# Patient Record
Sex: FEMALE | Race: WHITE | NOT HISPANIC OR LATINO | Employment: FULL TIME | URBAN - METROPOLITAN AREA
[De-identification: names, ages, dates, MRNs, and addresses within clinical notes are randomized per-mention and may not be internally consistent; named-entity substitution may affect disease eponyms.]

---

## 2017-10-31 ENCOUNTER — OFFICE VISIT (OUTPATIENT)
Dept: PRIMARY CARE CLINIC | Facility: CLINIC | Age: 22
End: 2017-10-31
Payer: MEDICAID

## 2017-10-31 VITALS
BODY MASS INDEX: 25.54 KG/M2 | HEART RATE: 68 BPM | WEIGHT: 149.63 LBS | RESPIRATION RATE: 18 BRPM | HEIGHT: 64 IN | SYSTOLIC BLOOD PRESSURE: 108 MMHG | TEMPERATURE: 98 F | DIASTOLIC BLOOD PRESSURE: 72 MMHG

## 2017-10-31 DIAGNOSIS — J06.9 VIRAL URI WITH COUGH: Primary | ICD-10-CM

## 2017-10-31 PROBLEM — G47.00 INSOMNIA: Status: ACTIVE | Noted: 2017-10-31

## 2017-10-31 PROBLEM — F32.A DEPRESSION: Status: ACTIVE | Noted: 2017-10-31

## 2017-10-31 PROBLEM — G43.009 NONINTRACTABLE MIGRAINE: Status: ACTIVE | Noted: 2017-10-31

## 2017-10-31 PROBLEM — R91.1 PULMONARY NODULE: Status: ACTIVE | Noted: 2017-10-31

## 2017-10-31 PROCEDURE — 99214 OFFICE O/P EST MOD 30 MIN: CPT | Mod: S$PBB,,, | Performed by: NURSE PRACTITIONER

## 2017-10-31 PROCEDURE — 99999 PR PBB SHADOW E&M-NEW PATIENT-LVL III: CPT | Mod: PBBFAC,,, | Performed by: NURSE PRACTITIONER

## 2017-10-31 PROCEDURE — 99203 OFFICE O/P NEW LOW 30 MIN: CPT | Mod: PBBFAC,PN | Performed by: NURSE PRACTITIONER

## 2017-10-31 RX ORDER — PROMETHAZINE HYDROCHLORIDE AND DEXTROMETHORPHAN HYDROBROMIDE 6.25; 15 MG/5ML; MG/5ML
5 SYRUP ORAL
Qty: 118 ML | Refills: 0 | Status: SHIPPED | OUTPATIENT
Start: 2017-10-31 | End: 2017-11-10

## 2017-10-31 RX ORDER — METHYLPREDNISOLONE 4 MG/1
TABLET ORAL
Qty: 1 PACKAGE | Refills: 0 | Status: SHIPPED | OUTPATIENT
Start: 2017-10-31 | End: 2018-02-01

## 2017-10-31 NOTE — PROGRESS NOTES
Chief Complaint  Chief Complaint   Patient presents with    Nasal Congestion     symptoms started 3 days ago    Sinusitis    Cough    Fever     chlls and fever the last 2 nights       HPI  Anabela Carrizales is a 22 y.o. female with multiple medical diagnoses as listed in the medical history and problem list that presents for nasal congestion, chills, and muscle aches for approximately 3 days.  Patient is new to me but is known to this clinic. Cough, non-productive. No sore throat. + diarrhea. Decreased appetite. Not currently taking any OTC meds at this time. Denies CP, SOB, palpitations.       PAST MEDICAL HISTORY:  History reviewed. No pertinent past medical history.    PAST SURGICAL HISTORY:  History reviewed. No pertinent surgical history.    SOCIAL HISTORY:  Social History     Social History    Marital status: Single     Spouse name: N/A    Number of children: N/A    Years of education: N/A     Occupational History    Not on file.     Social History Main Topics    Smoking status: Current Every Day Smoker     Packs/day: 0.50     Types: Cigarettes    Smokeless tobacco: Never Used    Alcohol use Yes      Comment: 3-4 x a week    Drug use: No    Sexual activity: Not Currently     Other Topics Concern    Not on file     Social History Narrative    No narrative on file       FAMILY HISTORY:  Family History   Problem Relation Age of Onset    Family history unknown: Yes       ALLERGIES AND MEDICATIONS: updated and reviewed.  Review of patient's allergies indicates:  No Known Allergies  Current Outpatient Prescriptions   Medication Sig Dispense Refill    methylPREDNISolone (MEDROL DOSEPACK) 4 mg tablet use as directed 1 Package 0    promethazine-dextromethorphan (PROMETHAZINE-DM) 6.25-15 mg/5 mL Syrp Take 5 mLs by mouth every 4 to 6 hours as needed. 118 mL 0     No current facility-administered medications for this visit.          ROS  Review of Systems   Constitutional: Negative for chills, fatigue  "and fever.   HENT: Positive for congestion, postnasal drip, rhinorrhea and sinus pressure. Negative for sore throat.    Respiratory: Positive for cough. Negative for chest tightness and shortness of breath.    Cardiovascular: Negative for chest pain and palpitations.   Gastrointestinal: Negative for blood in stool, diarrhea, nausea and vomiting.   Genitourinary: Negative for dysuria, frequency, hematuria and urgency.   Musculoskeletal: Negative for arthralgias and joint swelling.   Skin: Negative for rash and wound.   Neurological: Negative for dizziness and headaches.   Psychiatric/Behavioral: Negative for dysphoric mood and sleep disturbance. The patient is not nervous/anxious.          PHYSICAL EXAM  Vitals:    10/31/17 1418   BP: 108/72   BP Location: Left arm   Patient Position: Sitting   BP Method: Medium (Automatic)   Pulse: 68   Resp: 18   Temp: 98.1 °F (36.7 °C)   TempSrc: Oral   Weight: 67.9 kg (149 lb 9.6 oz)   Height: 5' 4" (1.626 m)    Body mass index is 25.68 kg/m².  Weight: 67.9 kg (149 lb 9.6 oz)   Height: 5' 4" (162.6 cm)     Physical Exam   Constitutional: She is oriented to person, place, and time. She appears well-developed and well-nourished.   HENT:   Head: Normocephalic.   Right Ear: Tympanic membrane normal.   Left Ear: Tympanic membrane normal.   Mouth/Throat: Uvula is midline and mucous membranes are normal. Posterior oropharyngeal erythema present.   Eyes: Conjunctivae are normal.   Cardiovascular: Normal rate, regular rhythm, normal heart sounds and normal pulses.    No murmur heard.  Pulses:       Radial pulses are 2+ on the right side, and 2+ on the left side.   Pulmonary/Chest: Effort normal and breath sounds normal. She has no wheezes.   Abdominal: Soft. Bowel sounds are normal. There is no tenderness.   Musculoskeletal: She exhibits no edema.   Lymphadenopathy:     She has no cervical adenopathy.   Neurological: She is alert and oriented to person, place, and time.   Skin: Skin is " warm and dry. No rash noted.   Psychiatric: She has a normal mood and affect.         Health Maintenance    Patient has no pending health maintenance at this time           Assessment & Plan    Anabela was seen today for nasal congestion, sinusitis, cough and fever.    Diagnoses and all orders for this visit:    Viral URI with cough        - Symptomatic treatment with tylenol and motrin.     Other orders  -     methylPREDNISolone (MEDROL DOSEPACK) 4 mg tablet; use as directed  -     promethazine-dextromethorphan (PROMETHAZINE-DM) 6.25-15 mg/5 mL Syrp; Take 5 mLs by mouth every 4 to 6 hours as needed.    Health Maintenance reviewed.    Follow-up: Return if symptoms worsen or fail to improve.

## 2018-03-07 ENCOUNTER — OFFICE VISIT (OUTPATIENT)
Dept: PRIMARY CARE CLINIC | Facility: CLINIC | Age: 23
End: 2018-03-07
Payer: MEDICAID

## 2018-03-07 VITALS
BODY MASS INDEX: 26.57 KG/M2 | OXYGEN SATURATION: 99 % | RESPIRATION RATE: 18 BRPM | DIASTOLIC BLOOD PRESSURE: 66 MMHG | SYSTOLIC BLOOD PRESSURE: 102 MMHG | TEMPERATURE: 98 F | WEIGHT: 155.63 LBS | HEIGHT: 64 IN | HEART RATE: 76 BPM

## 2018-03-07 DIAGNOSIS — R10.9 ABDOMINAL PAIN, UNSPECIFIED ABDOMINAL LOCATION: ICD-10-CM

## 2018-03-07 DIAGNOSIS — J02.9 PHARYNGITIS, UNSPECIFIED ETIOLOGY: Primary | ICD-10-CM

## 2018-03-07 LAB
BILIRUB SERPL-MCNC: ABNORMAL MG/DL
BLOOD URINE, POC: ABNORMAL
COLOR, POC UA: YELLOW
CTP QC/QA: YES
GLUCOSE UR QL STRIP: NORMAL
KETONES UR QL STRIP: ABNORMAL
LEUKOCYTE ESTERASE URINE, POC: ABNORMAL
NITRITE, POC UA: ABNORMAL
PH, POC UA: 6
PROTEIN, POC: ABNORMAL
S PYO RRNA THROAT QL PROBE: NEGATIVE
SPECIFIC GRAVITY, POC UA: 1.02
UROBILINOGEN, POC UA: NORMAL

## 2018-03-07 PROCEDURE — 87880 STREP A ASSAY W/OPTIC: CPT | Mod: PBBFAC,PN | Performed by: NURSE PRACTITIONER

## 2018-03-07 PROCEDURE — 99214 OFFICE O/P EST MOD 30 MIN: CPT | Mod: PBBFAC,PN | Performed by: NURSE PRACTITIONER

## 2018-03-07 PROCEDURE — 87088 URINE BACTERIA CULTURE: CPT

## 2018-03-07 PROCEDURE — 87086 URINE CULTURE/COLONY COUNT: CPT

## 2018-03-07 PROCEDURE — 99999 PR PBB SHADOW E&M-EST. PATIENT-LVL IV: CPT | Mod: PBBFAC,,, | Performed by: NURSE PRACTITIONER

## 2018-03-07 PROCEDURE — 81002 URINALYSIS NONAUTO W/O SCOPE: CPT | Mod: PBBFAC,PN | Performed by: NURSE PRACTITIONER

## 2018-03-07 PROCEDURE — 99214 OFFICE O/P EST MOD 30 MIN: CPT | Mod: S$PBB,,, | Performed by: NURSE PRACTITIONER

## 2018-03-07 PROCEDURE — 87147 CULTURE TYPE IMMUNOLOGIC: CPT

## 2018-03-07 RX ORDER — AMOXICILLIN AND CLAVULANATE POTASSIUM 875; 125 MG/1; MG/1
1 TABLET, FILM COATED ORAL EVERY 12 HOURS
Qty: 14 TABLET | Refills: 0 | Status: SHIPPED | OUTPATIENT
Start: 2018-03-07 | End: 2018-07-26

## 2018-03-07 RX ORDER — PROMETHAZINE HYDROCHLORIDE AND DEXTROMETHORPHAN HYDROBROMIDE 6.25; 15 MG/5ML; MG/5ML
5 SYRUP ORAL
Qty: 180 ML | Refills: 0 | Status: SHIPPED | OUTPATIENT
Start: 2018-03-07 | End: 2018-03-17

## 2018-03-07 NOTE — PROGRESS NOTES
Chief Complaint  Chief Complaint   Patient presents with    Sore Throat     x 4 days    Abdominal Pain       HPI  Anabela Carrizales is a 22 y.o. female with multiple medical diagnoses as listed in the medical history and problem list that presents for sore throat and difficulty swallowing.  Patient is new to me but is known to this clinic with her last appointment being 10/31/2017.  Patient reports the onset of sore throat, dysphagia, abdominal pain 4 days ago. No associated vomiting or diarrhea. No fever or chills. No cough. No earpain. No known sick contacts. Abdominal pain described as stabbing bilateral sides and back. No dysuria, suprapubic pressure, hematuria. Patient denies cp, sob, palpitations.       PAST MEDICAL HISTORY:  History reviewed. No pertinent past medical history.    PAST SURGICAL HISTORY:  History reviewed. No pertinent surgical history.    SOCIAL HISTORY:  Social History     Social History    Marital status: Single     Spouse name: N/A    Number of children: N/A    Years of education: N/A     Occupational History    Not on file.     Social History Main Topics    Smoking status: Current Every Day Smoker     Packs/day: 0.00     Types: Vaping with nicotine    Smokeless tobacco: Never Used    Alcohol use Yes      Comment: 3-4 x a week    Drug use: No    Sexual activity: Yes     Birth control/ protection: None     Other Topics Concern    Not on file     Social History Narrative    No narrative on file       FAMILY HISTORY:  Family History   Problem Relation Age of Onset    Family history unknown: Yes       ALLERGIES AND MEDICATIONS: updated and reviewed.  Review of patient's allergies indicates:  No Known Allergies  No current outpatient prescriptions on file.     No current facility-administered medications for this visit.          ROS  Review of Systems   Constitutional: Negative for chills, fatigue and fever.   HENT: Positive for sore throat and trouble swallowing. Negative for  "congestion, rhinorrhea and sinus pressure.    Respiratory: Positive for cough. Negative for chest tightness and shortness of breath.    Cardiovascular: Negative for chest pain and palpitations.   Gastrointestinal: Positive for abdominal pain. Negative for blood in stool, diarrhea, nausea and vomiting.   Genitourinary: Negative for dysuria, frequency, hematuria and urgency.   Musculoskeletal: Negative for arthralgias and back pain.   Skin: Negative for rash and wound.   Neurological: Negative for dizziness and headaches.   Psychiatric/Behavioral: Negative for dysphoric mood and sleep disturbance. The patient is not nervous/anxious.          PHYSICAL EXAM  Vitals:    03/07/18 1055   BP: 102/66   BP Location: Right arm   Patient Position: Sitting   BP Method: Medium (Automatic)   Pulse: 76   Resp: 18   Temp: 98.2 °F (36.8 °C)   TempSrc: Oral   SpO2: 99%   Weight: 70.6 kg (155 lb 9.6 oz)   Height: 5' 4" (1.626 m)    Body mass index is 26.71 kg/m².  Weight: 70.6 kg (155 lb 9.6 oz)   Height: 5' 4" (162.6 cm)     Physical Exam   Constitutional: She is oriented to person, place, and time. She appears well-developed and well-nourished.   HENT:   Head: Normocephalic.   Right Ear: Tympanic membrane normal.   Left Ear: Tympanic membrane normal.   Mouth/Throat: Uvula is midline and mucous membranes are normal. Posterior oropharyngeal erythema present. Tonsils are 2+ on the right. Tonsils are 2+ on the left. No tonsillar exudate.   Eyes: Conjunctivae are normal.   Cardiovascular: Normal rate, regular rhythm, normal heart sounds and normal pulses.    No murmur heard.  Pulses:       Radial pulses are 2+ on the right side, and 2+ on the left side.   No LE swelling noted   Pulmonary/Chest: Effort normal and breath sounds normal. She has no wheezes.   Abdominal: Soft. Bowel sounds are normal. There is no tenderness.   Musculoskeletal: She exhibits no edema.   Lymphadenopathy:     She has cervical adenopathy.   Neurological: She is " alert and oriented to person, place, and time.   Skin: Skin is warm and dry. No rash noted.   Psychiatric: She has a normal mood and affect.         Health Maintenance       Date Due Completion Date    Lipid Panel 1996 ---    HPV Vaccines (1 of 3 - Female 3 Dose Series) 04/15/2007 ---    TETANUS VACCINE 04/15/2014 ---    Pneumococcal PPSV23 (Medium Risk) (1) 04/15/2014 ---    Pap Smear 04/15/2017 ---    Influenza Vaccine 08/01/2017 ---    CHLAMYDIA SCREENING 02/01/2019 2/1/2018            Assessment & Plan    Anabela was seen today for sore throat and abdominal pain.    Diagnoses and all orders for this visit:    Pharyngitis, unspecified etiology  -     POCT Rapid Strep A  -     amoxicillin-clavulanate 875-125mg (AUGMENTIN) 875-125 mg per tablet; Take 1 tablet by mouth every 12 (twelve) hours.  -     promethazine-dextromethorphan (PROMETHAZINE-DM) 6.25-15 mg/5 mL Syrp; Take 5 mLs by mouth every 4 to 6 hours as needed.    Abdominal pain, unspecified abdominal location  -     POCT urine dipstick without microscope  -     Urine culture        Follow-up: Follow-up if symptoms worsen or fail to improve.

## 2018-03-09 LAB — BACTERIA UR CULT: NORMAL

## 2018-07-26 ENCOUNTER — CLINICAL SUPPORT (OUTPATIENT)
Dept: PRIMARY CARE CLINIC | Facility: CLINIC | Age: 23
End: 2018-07-26
Payer: MEDICAID

## 2018-07-26 ENCOUNTER — TELEPHONE (OUTPATIENT)
Dept: PRIMARY CARE CLINIC | Facility: CLINIC | Age: 23
End: 2018-07-26

## 2018-07-26 DIAGNOSIS — R39.15 URINARY URGENCY: Primary | ICD-10-CM

## 2018-07-26 DIAGNOSIS — R35.0 URINARY FREQUENCY: ICD-10-CM

## 2018-07-26 DIAGNOSIS — R10.30 LOWER ABDOMINAL PAIN: ICD-10-CM

## 2018-07-26 LAB
BILIRUB SERPL-MCNC: ABNORMAL MG/DL
BLOOD URINE, POC: 250
COLOR, POC UA: YELLOW
GLUCOSE UR QL STRIP: NORMAL
KETONES UR QL STRIP: ABNORMAL
LEUKOCYTE ESTERASE URINE, POC: ABNORMAL
NITRITE, POC UA: POSITIVE
PH, POC UA: 7
PROTEIN, POC: ABNORMAL
SPECIFIC GRAVITY, POC UA: 1.01
UROBILINOGEN, POC UA: NORMAL

## 2018-07-26 PROCEDURE — 99999 PR PBB SHADOW E&M-EST. PATIENT-LVL II: CPT | Mod: PBBFAC,,,

## 2018-07-26 PROCEDURE — 87086 URINE CULTURE/COLONY COUNT: CPT

## 2018-07-26 PROCEDURE — 99212 OFFICE O/P EST SF 10 MIN: CPT | Mod: PBBFAC,PN

## 2018-07-26 PROCEDURE — 81002 URINALYSIS NONAUTO W/O SCOPE: CPT | Mod: PBBFAC,PN

## 2018-07-26 RX ORDER — NITROFURANTOIN 25; 75 MG/1; MG/1
100 CAPSULE ORAL 2 TIMES DAILY
Qty: 14 CAPSULE | Refills: 0 | Status: SHIPPED | OUTPATIENT
Start: 2018-07-26 | End: 2018-08-02

## 2018-07-26 NOTE — TELEPHONE ENCOUNTER
----- Message from Jenni Butcher sent at 7/26/2018 12:40 PM CDT -----  Type:  Patient Returning Call    Who Called:  patient  Who Left Message for Patient:  Zachariah  Does the patient know what this is regarding?:  yes  Best Call Back Number:  [phone] (home)     Additional Information:  Na

## 2018-07-26 NOTE — TELEPHONE ENCOUNTER
----- Message from Kayleigh Lawler sent at 7/26/2018 10:51 AM CDT -----  Contact: Self  Patient needs to get in today for a UTI   No appts available today or tomorrow     Please call back [phone]

## 2018-07-28 LAB — BACTERIA UR CULT: NORMAL

## 2018-09-04 ENCOUNTER — OFFICE VISIT (OUTPATIENT)
Dept: PRIMARY CARE CLINIC | Facility: CLINIC | Age: 23
End: 2018-09-04
Payer: MEDICAID

## 2018-09-04 ENCOUNTER — CLINICAL SUPPORT (OUTPATIENT)
Dept: PRIMARY CARE CLINIC | Facility: CLINIC | Age: 23
End: 2018-09-04
Payer: MEDICAID

## 2018-09-04 VITALS
WEIGHT: 174 LBS | HEART RATE: 79 BPM | DIASTOLIC BLOOD PRESSURE: 66 MMHG | TEMPERATURE: 98 F | HEIGHT: 64 IN | RESPIRATION RATE: 18 BRPM | BODY MASS INDEX: 29.71 KG/M2 | SYSTOLIC BLOOD PRESSURE: 109 MMHG

## 2018-09-04 DIAGNOSIS — N94.9 GENITAL LESION, FEMALE: Primary | ICD-10-CM

## 2018-09-04 DIAGNOSIS — Z00.00 ROUTINE PHYSICAL EXAMINATION: ICD-10-CM

## 2018-09-04 DIAGNOSIS — N94.9 GENITAL LESION, FEMALE: ICD-10-CM

## 2018-09-04 DIAGNOSIS — K12.0 CANKER SORES ORAL: ICD-10-CM

## 2018-09-04 LAB
ALBUMIN SERPL BCP-MCNC: 4.4 G/DL
ALP SERPL-CCNC: 51 U/L
ALT SERPL W/O P-5'-P-CCNC: 13 U/L
ANION GAP SERPL CALC-SCNC: 6 MMOL/L
AST SERPL-CCNC: 18 U/L
BASOPHILS # BLD AUTO: 0 K/UL
BASOPHILS NFR BLD: 0.4 %
BILIRUB SERPL-MCNC: 0.7 MG/DL
BUN SERPL-MCNC: 11 MG/DL
CALCIUM SERPL-MCNC: 9.4 MG/DL
CHLORIDE SERPL-SCNC: 101 MMOL/L
CO2 SERPL-SCNC: 29 MMOL/L
CREAT SERPL-MCNC: 0.8 MG/DL
DIFFERENTIAL METHOD: ABNORMAL
EOSINOPHIL # BLD AUTO: 0.3 K/UL
EOSINOPHIL NFR BLD: 4.7 %
ERYTHROCYTE [DISTWIDTH] IN BLOOD BY AUTOMATED COUNT: 13.3 %
EST. GFR  (AFRICAN AMERICAN): >60 ML/MIN/1.73 M^2
EST. GFR  (NON AFRICAN AMERICAN): >60 ML/MIN/1.73 M^2
GLUCOSE SERPL-MCNC: 88 MG/DL
HCT VFR BLD AUTO: 39.6 %
HGB BLD-MCNC: 13.5 G/DL
LYMPHOCYTES # BLD AUTO: 2 K/UL
LYMPHOCYTES NFR BLD: 29.9 %
MCH RBC QN AUTO: 30 PG
MCHC RBC AUTO-ENTMCNC: 34.1 G/DL
MCV RBC AUTO: 88 FL
MONOCYTES # BLD AUTO: 0.8 K/UL
MONOCYTES NFR BLD: 11.4 %
NEUTROPHILS # BLD AUTO: 3.7 K/UL
NEUTROPHILS NFR BLD: 53.6 %
PLATELET # BLD AUTO: 252 K/UL
PMV BLD AUTO: 8 FL
POTASSIUM SERPL-SCNC: 3.8 MMOL/L
PROT SERPL-MCNC: 7.6 G/DL
RBC # BLD AUTO: 4.5 M/UL
SODIUM SERPL-SCNC: 136 MMOL/L
TSH SERPL DL<=0.005 MIU/L-ACNC: 1.5 UIU/ML
WBC # BLD AUTO: 6.8 K/UL

## 2018-09-04 PROCEDURE — 99213 OFFICE O/P EST LOW 20 MIN: CPT | Mod: PBBFAC,PN | Performed by: NURSE PRACTITIONER

## 2018-09-04 PROCEDURE — 99999 PR PBB SHADOW E&M-EST. PATIENT-LVL III: CPT | Mod: PBBFAC,,, | Performed by: NURSE PRACTITIONER

## 2018-09-04 PROCEDURE — 87491 CHLMYD TRACH DNA AMP PROBE: CPT

## 2018-09-04 PROCEDURE — 99999 PR PBB SHADOW E&M-EST. PATIENT-LVL II: CPT | Mod: PBBFAC,,,

## 2018-09-04 PROCEDURE — 86696 HERPES SIMPLEX TYPE 2 TEST: CPT

## 2018-09-04 PROCEDURE — 99214 OFFICE O/P EST MOD 30 MIN: CPT | Mod: S$PBB,,, | Performed by: NURSE PRACTITIONER

## 2018-09-04 PROCEDURE — 86592 SYPHILIS TEST NON-TREP QUAL: CPT

## 2018-09-04 PROCEDURE — 99212 OFFICE O/P EST SF 10 MIN: CPT | Mod: PBBFAC,27,PN

## 2018-09-04 PROCEDURE — 86703 HIV-1/HIV-2 1 RESULT ANTBDY: CPT

## 2018-09-04 NOTE — PROGRESS NOTES
"Chief Complaint  Chief Complaint   Patient presents with    Rash     breakouts that are irritated and hurt       HPI  Anabela Carrizales is a 23 y.o. female with multiple medical diagnoses as listed in the medical history and problem list that presents for oral and vaginal lesion.    Patient reports he initial onset of canker sores in her mouth and sores in her general region approximately 10 years ago when she was 13 years old.  Reports a period of dormancy when she had new lesions that have recurred more frequently over the last year.  Reports the occurrence of vaginal sores monthly.  Described as small, painful, blisters.  The go independently without any intervention.  Associated painful urination.  States that she went to the ER in February and had a full a STI workup with negative for all STIs.  At this time she was treated with antibiotics and told "hope this helps".  Associated fatigue when the lesions occur.  Treatment with Epson salt baths.  No history of HSV exposure that she knows of.  Reports that the original vaginal lesions occurred prior to her having sexual intercourse.      PAST MEDICAL HISTORY:  History reviewed. No pertinent past medical history.    PAST SURGICAL HISTORY:  History reviewed. No pertinent surgical history.    SOCIAL HISTORY:  Social History     Socioeconomic History    Marital status: Single     Spouse name: Not on file    Number of children: Not on file    Years of education: Not on file    Highest education level: Not on file   Social Needs    Financial resource strain: Not on file    Food insecurity - worry: Not on file    Food insecurity - inability: Not on file    Transportation needs - medical: Not on file    Transportation needs - non-medical: Not on file   Occupational History    Not on file   Tobacco Use    Smoking status: Current Every Day Smoker     Packs/day: 0.00     Types: Vaping with nicotine    Smokeless tobacco: Never Used   Substance and Sexual " "Activity    Alcohol use: Yes     Comment: 3-4 x a week    Drug use: No    Sexual activity: Yes     Birth control/protection: None   Other Topics Concern    Not on file   Social History Narrative    Not on file       FAMILY HISTORY:  Family History   Family history unknown: Yes       ALLERGIES AND MEDICATIONS: updated and reviewed.  Review of patient's allergies indicates:   Allergen Reactions    Bactrim [sulfamethoxazole-trimethoprim] Other (See Comments)     Stomach upset     No current outpatient medications on file.     No current facility-administered medications for this visit.          ROS  Review of Systems   Constitutional: Positive for fatigue and unexpected weight change (Weight gain). Negative for chills and fever.   HENT: Negative for congestion, rhinorrhea, sinus pressure and sore throat.    Respiratory: Negative for cough, chest tightness and shortness of breath.    Cardiovascular: Negative for chest pain and palpitations.   Gastrointestinal: Negative for blood in stool, diarrhea, nausea and vomiting.   Genitourinary: Positive for dysuria. Negative for frequency, hematuria and urgency.   Musculoskeletal: Negative for arthralgias and back pain.   Skin: Positive for wound. Negative for rash.   Neurological: Negative for dizziness and headaches.   Psychiatric/Behavioral: Negative for dysphoric mood and sleep disturbance. The patient is not nervous/anxious.          PHYSICAL EXAM  Vitals:    09/04/18 1302   BP: 109/66   BP Location: Right arm   Patient Position: Sitting   BP Method: Medium (Automatic)   Pulse: 79   Resp: 18   Temp: 98.1 °F (36.7 °C)   TempSrc: Oral   Weight: 78.9 kg (174 lb)   Height: 5' 4" (1.626 m)    Body mass index is 29.87 kg/m².  Weight: 78.9 kg (174 lb)   Height: 5' 4" (162.6 cm)     Physical Exam   Constitutional: She is oriented to person, place, and time. She appears well-developed and well-nourished.   HENT:   Head: Normocephalic.   Right Ear: Tympanic membrane normal. "   Left Ear: Tympanic membrane normal.   Mouth/Throat: Uvula is midline, oropharynx is clear and moist and mucous membranes are normal.   Eyes: Conjunctivae are normal.   Cardiovascular: Normal rate, regular rhythm, normal heart sounds and normal pulses.   No murmur heard.  Pulses:       Radial pulses are 2+ on the right side, and 2+ on the left side.   Pulmonary/Chest: Effort normal and breath sounds normal. She has no wheezes.   Abdominal: Soft. Bowel sounds are normal. There is no tenderness.   Musculoskeletal: She exhibits no edema.   Lymphadenopathy:     She has no cervical adenopathy.   Neurological: She is alert and oriented to person, place, and time.   Skin: Skin is warm and dry. No rash noted.   Psychiatric: She has a normal mood and affect.         Health Maintenance       Date Due Completion Date    Lipid Panel 1996 ---    HPV Vaccines (1 of 3 - Female 3-dose series) 04/15/2007 ---    TETANUS VACCINE 04/15/2014 ---    Pneumococcal PPSV23 (Medium Risk) (1) 04/15/2014 ---    Pap Smear 04/15/2017 ---    Influenza Vaccine 08/01/2018 ---    CHLAMYDIA SCREENING 02/01/2019 2/1/2018            Assessment & Plan    Anabela was seen today for rash.    Diagnoses and all orders for this visit:    Genital lesion, female  -     HIV-1 and HIV-2 antibodies; Future  -     RPR; Future  -     C. trachomatis/N. gonorrhoeae by AMP DNA  -     HERPES SIMPLEX 1&2 IGG; Future  No noted HIV, RPR screening in the emergency room in February.  Instructed the patient that it is likely HSV but to return to clinic with a lesions presents to the we can culture it.    Canker sores oral    Routine physical examination  -     CBC auto differential; Future  -     Comprehensive metabolic panel; Future  -     TSH; Future          Follow-up: No Follow-up on file.

## 2018-09-05 LAB
C TRACH DNA SPEC QL NAA+PROBE: NOT DETECTED
HIV 1+2 AB+HIV1 P24 AG SERPL QL IA: NEGATIVE
N GONORRHOEA DNA SPEC QL NAA+PROBE: NOT DETECTED
RPR SER QL: NORMAL

## 2018-09-07 LAB
HSV1 IGG SERPL QL IA: NEGATIVE
HSV2 IGG SERPL QL IA: POSITIVE

## 2018-10-01 ENCOUNTER — TELEPHONE (OUTPATIENT)
Dept: PRIMARY CARE CLINIC | Facility: CLINIC | Age: 23
End: 2018-10-01

## 2018-10-01 NOTE — TELEPHONE ENCOUNTER
Called spoke with patient made her an appointment for tomorrow at 10am as requested. Verbally states understanding

## 2018-10-01 NOTE — TELEPHONE ENCOUNTER
----- Message from Ruslan Perera sent at 10/1/2018  3:57 PM CDT -----  Type:  Sooner Apoointment Request    Caller is requesting a sooner appointment.  Caller declined first available appointment listed below.  Caller will not accept being placed on the waitlist and is requesting a message be sent to doctor.    Name of Caller:  Patient  When is the first available appointment?  Na  Symptoms:  Possible UTI  Best Call Back Number:  [phone]

## 2018-10-02 ENCOUNTER — OFFICE VISIT (OUTPATIENT)
Dept: PRIMARY CARE CLINIC | Facility: CLINIC | Age: 23
End: 2018-10-02
Payer: MEDICAID

## 2018-10-02 VITALS
RESPIRATION RATE: 18 BRPM | HEART RATE: 64 BPM | SYSTOLIC BLOOD PRESSURE: 106 MMHG | TEMPERATURE: 99 F | HEIGHT: 64 IN | OXYGEN SATURATION: 99 % | DIASTOLIC BLOOD PRESSURE: 62 MMHG | WEIGHT: 174.63 LBS | BODY MASS INDEX: 29.81 KG/M2

## 2018-10-02 DIAGNOSIS — N30.90 CYSTITIS: Primary | ICD-10-CM

## 2018-10-02 DIAGNOSIS — B00.9 HSV INFECTION: ICD-10-CM

## 2018-10-02 PROCEDURE — 99214 OFFICE O/P EST MOD 30 MIN: CPT | Mod: PBBFAC,PN | Performed by: NURSE PRACTITIONER

## 2018-10-02 PROCEDURE — 99999 PR PBB SHADOW E&M-EST. PATIENT-LVL IV: CPT | Mod: PBBFAC,,, | Performed by: NURSE PRACTITIONER

## 2018-10-02 PROCEDURE — 99214 OFFICE O/P EST MOD 30 MIN: CPT | Mod: S$PBB,,, | Performed by: NURSE PRACTITIONER

## 2018-10-02 RX ORDER — VALACYCLOVIR HYDROCHLORIDE 1 G/1
1000 TABLET, FILM COATED ORAL DAILY
Qty: 30 TABLET | Refills: 11 | Status: SHIPPED | OUTPATIENT
Start: 2018-10-02 | End: 2019-01-10 | Stop reason: ALTCHOICE

## 2018-10-02 RX ORDER — NITROFURANTOIN 25; 75 MG/1; MG/1
100 CAPSULE ORAL 2 TIMES DAILY
Qty: 14 CAPSULE | Refills: 0 | Status: SHIPPED | OUTPATIENT
Start: 2018-10-02 | End: 2018-10-09

## 2019-01-08 ENCOUNTER — TELEPHONE (OUTPATIENT)
Dept: PRIMARY CARE CLINIC | Facility: CLINIC | Age: 24
End: 2019-01-08

## 2019-01-08 NOTE — TELEPHONE ENCOUNTER
----- Message from Courtney Siri sent at 1/8/2019  8:25 AM CST -----  Contact: Patient  Type: Needs Medical Advice    Who Called:  Anabela, patient  Symptoms (please be specific):  Recurrent bladder infection  How long has patient had these symptoms:  2 days  Pharmacy name and phone #:    [id number] - [place] - [address] AT [facility] [address] [address]  Phone: [phone] Fax: [phone]  Best Call Back Number: [phone]  Additional Information: Calling because her bladder is hurting her again. Please advise. Thanks.

## 2019-01-10 ENCOUNTER — OFFICE VISIT (OUTPATIENT)
Dept: PRIMARY CARE CLINIC | Facility: CLINIC | Age: 24
End: 2019-01-10
Payer: MEDICAID

## 2019-01-10 VITALS
HEIGHT: 64 IN | TEMPERATURE: 98 F | RESPIRATION RATE: 18 BRPM | BODY MASS INDEX: 30.42 KG/M2 | WEIGHT: 178.19 LBS | DIASTOLIC BLOOD PRESSURE: 63 MMHG | SYSTOLIC BLOOD PRESSURE: 107 MMHG | OXYGEN SATURATION: 98 % | HEART RATE: 67 BPM

## 2019-01-10 DIAGNOSIS — N30.90 CYSTITIS: Primary | ICD-10-CM

## 2019-01-10 PROBLEM — A60.04 HERPES SIMPLEX VULVOVAGINITIS: Status: ACTIVE | Noted: 2019-01-10

## 2019-01-10 LAB
BILIRUB SERPL-MCNC: ABNORMAL MG/DL
BLOOD URINE, POC: ABNORMAL
COLOR, POC UA: YELLOW
GLUCOSE UR QL STRIP: ABNORMAL
KETONES UR QL STRIP: ABNORMAL
LEUKOCYTE ESTERASE URINE, POC: ABNORMAL
NITRITE, POC UA: ABNORMAL
PH, POC UA: ABNORMAL
PROTEIN, POC: ABNORMAL
SPECIFIC GRAVITY, POC UA: 1.02
UROBILINOGEN, POC UA: ABNORMAL

## 2019-01-10 PROCEDURE — 87086 URINE CULTURE/COLONY COUNT: CPT

## 2019-01-10 PROCEDURE — 81002 URINALYSIS NONAUTO W/O SCOPE: CPT | Mod: PBBFAC,PN | Performed by: NURSE PRACTITIONER

## 2019-01-10 PROCEDURE — 99214 OFFICE O/P EST MOD 30 MIN: CPT | Mod: PBBFAC,PN | Performed by: NURSE PRACTITIONER

## 2019-01-10 PROCEDURE — 87088 URINE BACTERIA CULTURE: CPT

## 2019-01-10 PROCEDURE — 99214 OFFICE O/P EST MOD 30 MIN: CPT | Mod: S$PBB,,, | Performed by: NURSE PRACTITIONER

## 2019-01-10 PROCEDURE — 99999 PR PBB SHADOW E&M-EST. PATIENT-LVL IV: ICD-10-PCS | Mod: PBBFAC,,, | Performed by: NURSE PRACTITIONER

## 2019-01-10 PROCEDURE — 99214 PR OFFICE/OUTPT VISIT, EST, LEVL IV, 30-39 MIN: ICD-10-PCS | Mod: S$PBB,,, | Performed by: NURSE PRACTITIONER

## 2019-01-10 PROCEDURE — 99999 PR PBB SHADOW E&M-EST. PATIENT-LVL IV: CPT | Mod: PBBFAC,,, | Performed by: NURSE PRACTITIONER

## 2019-01-10 RX ORDER — CEPHALEXIN 500 MG/1
500 CAPSULE ORAL EVERY 12 HOURS
Qty: 14 CAPSULE | Refills: 0 | Status: SHIPPED | OUTPATIENT
Start: 2019-01-10 | End: 2019-01-17

## 2019-01-10 NOTE — PROGRESS NOTES
Chief Complaint  Chief Complaint   Patient presents with    Urinary Tract Infection       HPI    Anabela Carrizales is a 23 y.o. female with multiple medical diagnoses as listed in the medical history and problem list that presents for bladder pain.    Bladder pain - Patient reports the onset of bladder pain approximately 4 days ago. Described as aching, pressure. Reports dysuria and pain with urination. Urinary frequency and hesitancy. No fever or chills. No flank pain. Patient with a h/o UTI in the past most recent infection non-resistant Ecoli. Patient denies vaginal discharge or lesions. Recent G/C screening negative.     PAST MEDICAL HISTORY:  History reviewed. No pertinent past medical history.    PAST SURGICAL HISTORY:  History reviewed. No pertinent surgical history.    SOCIAL HISTORY:  Social History     Socioeconomic History    Marital status: Single     Spouse name: Not on file    Number of children: Not on file    Years of education: Not on file    Highest education level: Not on file   Social Needs    Financial resource strain: Not on file    Food insecurity - worry: Not on file    Food insecurity - inability: Not on file    Transportation needs - medical: Not on file    Transportation needs - non-medical: Not on file   Occupational History    Not on file   Tobacco Use    Smoking status: Current Every Day Smoker     Packs/day: 0.00     Types: Vaping with nicotine    Smokeless tobacco: Never Used   Substance and Sexual Activity    Alcohol use: Yes     Comment: 3-4 x a week    Drug use: No    Sexual activity: Yes     Birth control/protection: None   Other Topics Concern    Not on file   Social History Narrative    Not on file       FAMILY HISTORY:  Family History   Family history unknown: Yes       ALLERGIES AND MEDICATIONS: updated and reviewed.  Review of patient's allergies indicates:   Allergen Reactions    Bactrim [sulfamethoxazole-trimethoprim] Other (See Comments)     Stomach  "upset     Current Outpatient Medications   Medication Sig Dispense Refill    cephALEXin (KEFLEX) 500 MG capsule Take 1 capsule (500 mg total) by mouth every 12 (twelve) hours. for 7 days 14 capsule 0     No current facility-administered medications for this visit.          ROS  Review of Systems   Constitutional: Negative for chills and fever.   Respiratory: Negative for cough, shortness of breath and wheezing.    Cardiovascular: Negative for chest pain.   Gastrointestinal: Negative for abdominal pain.   Genitourinary: Positive for difficulty urinating, dysuria, frequency and urgency. Negative for flank pain and hematuria.           PHYSICAL EXAM  Vitals:    01/10/19 1512   BP: 107/63   BP Location: Right arm   Patient Position: Sitting   BP Method: Medium (Automatic)   Pulse: 67   Resp: 18   Temp: 98.3 °F (36.8 °C)   TempSrc: Oral   SpO2: 98%   Weight: 80.8 kg (178 lb 3.2 oz)   Height: 5' 4" (1.626 m)    Body mass index is 30.59 kg/m².  Weight: 80.8 kg (178 lb 3.2 oz)   Height: 5' 4" (162.6 cm)     Physical Exam   Constitutional: She appears well-developed and well-nourished. She does not have a sickly appearance.   HENT:   Head: Normocephalic.   Cardiovascular: Normal rate, regular rhythm and normal heart sounds.   No murmur heard.  Pulmonary/Chest: Effort normal. She has no wheezes. She has no rales.   Abdominal: Soft. Normal appearance. There is tenderness in the suprapubic area.   Skin: Skin is warm and dry.   Vitals reviewed.        Health Maintenance       Date Due Completion Date    Lipid Panel 1996 ---    HPV Vaccines (1 - Female 3-dose series) 04/15/2011 ---    TETANUS VACCINE 04/15/2014 ---    Pneumococcal Vaccine (Medium Risk) (1 of 1 - PPSV23) 04/15/2015 ---    Pap Smear 04/15/2017 ---    Influenza Vaccine 08/01/2018 ---    CHLAMYDIA SCREENING 09/04/2019 9/4/2018            Assessment & Plan    Problem List Items Addressed This Visit     None      Visit Diagnoses     Cystitis    -  Primary    " Relevant Medications    cephALEXin (KEFLEX) 500 MG capsule    Other Relevant Orders    POCT URINE DIPSTICK WITHOUT MICROSCOPE (Completed)    Urine culture          Follow-up: Follow-up if symptoms worsen or fail to improve.    Fatimah Kate       Medication List           Accurate as of 1/10/19  6:02 PM. If you have any questions, ask your nurse or doctor.               START taking these medications    cephALEXin 500 MG capsule  Commonly known as:  KEFLEX  Take 1 capsule (500 mg total) by mouth every 12 (twelve) hours. for 7 days  Started by:  Fatimah Kate NP        STOP taking these medications    valACYclovir 1000 MG tablet  Commonly known as:  VALTREX  Stopped by:  Fatimah aKte NP           Where to Get Your Medications      These medications were sent to [facility] [id number] - [address] AT [facility] [address]  [address] [address]    Phone:  [phone]   · cephALEXin 500 MG capsule

## 2019-01-12 LAB — BACTERIA UR CULT: NORMAL

## 2019-01-29 ENCOUNTER — OFFICE VISIT (OUTPATIENT)
Dept: PRIMARY CARE CLINIC | Facility: CLINIC | Age: 24
End: 2019-01-29
Payer: MEDICAID

## 2019-01-29 VITALS
RESPIRATION RATE: 18 BRPM | TEMPERATURE: 98 F | SYSTOLIC BLOOD PRESSURE: 102 MMHG | HEART RATE: 71 BPM | HEIGHT: 64 IN | BODY MASS INDEX: 30.25 KG/M2 | DIASTOLIC BLOOD PRESSURE: 67 MMHG | WEIGHT: 177.19 LBS

## 2019-01-29 DIAGNOSIS — R68.89 FLU-LIKE SYMPTOMS: Primary | ICD-10-CM

## 2019-01-29 DIAGNOSIS — J10.1 INFLUENZA A: ICD-10-CM

## 2019-01-29 DIAGNOSIS — J06.9 VIRAL URI WITH COUGH: ICD-10-CM

## 2019-01-29 LAB
CTP QC/QA: YES
FLUAV AG NPH QL: POSITIVE
FLUBV AG NPH QL: NEGATIVE

## 2019-01-29 PROCEDURE — 87804 INFLUENZA ASSAY W/OPTIC: CPT | Mod: PBBFAC,PN | Performed by: NURSE PRACTITIONER

## 2019-01-29 PROCEDURE — 99999 PR PBB SHADOW E&M-EST. PATIENT-LVL III: CPT | Mod: PBBFAC,,, | Performed by: NURSE PRACTITIONER

## 2019-01-29 PROCEDURE — 99999 PR PBB SHADOW E&M-EST. PATIENT-LVL III: ICD-10-PCS | Mod: PBBFAC,,, | Performed by: NURSE PRACTITIONER

## 2019-01-29 PROCEDURE — 99214 OFFICE O/P EST MOD 30 MIN: CPT | Mod: S$PBB,,, | Performed by: NURSE PRACTITIONER

## 2019-01-29 PROCEDURE — 99214 PR OFFICE/OUTPT VISIT, EST, LEVL IV, 30-39 MIN: ICD-10-PCS | Mod: S$PBB,,, | Performed by: NURSE PRACTITIONER

## 2019-01-29 PROCEDURE — 99213 OFFICE O/P EST LOW 20 MIN: CPT | Mod: PBBFAC,PN | Performed by: NURSE PRACTITIONER

## 2019-01-29 RX ORDER — OSELTAMIVIR PHOSPHATE 75 MG/1
75 CAPSULE ORAL 2 TIMES DAILY
Qty: 10 CAPSULE | Refills: 0 | Status: SHIPPED | OUTPATIENT
Start: 2019-01-29 | End: 2019-02-03

## 2019-01-29 RX ORDER — PROMETHAZINE HYDROCHLORIDE AND DEXTROMETHORPHAN HYDROBROMIDE 6.25; 15 MG/5ML; MG/5ML
5 SYRUP ORAL
Qty: 118 ML | Refills: 0 | Status: SHIPPED | OUTPATIENT
Start: 2019-01-29 | End: 2019-04-11 | Stop reason: ALTCHOICE

## 2019-01-29 NOTE — PROGRESS NOTES
Chief Complaint  Chief Complaint   Patient presents with    flu symptoms       HPI    Anabela Carrizales is a 23 y.o. female that presents for sore throat, cough, congestion.    Reports the onset of symptoms approximately 5 days ago.  Reports sore throat described as worsening.  Cough which is improved.  Nonproductive without associated wheezing or shortness of breath.  Nasal congestion and fatigue.  Reports generalized body aches and myalgias.  Nausea intermittent vomiting.  Fever and chills-last fever of 101 on Sunday approximately 3 days ago.  Currently taking over-the-counter NyQuil, DayQuil, aspirin for relief of fever symptoms.  Patient reports 5-year-old son at home with recent diagnosis of the flu.      PAST MEDICAL HISTORY:  History reviewed. No pertinent past medical history.    PAST SURGICAL HISTORY:  History reviewed. No pertinent surgical history.    SOCIAL HISTORY:  Social History     Socioeconomic History    Marital status: Single     Spouse name: Not on file    Number of children: Not on file    Years of education: Not on file    Highest education level: Not on file   Social Needs    Financial resource strain: Not on file    Food insecurity - worry: Not on file    Food insecurity - inability: Not on file    Transportation needs - medical: Not on file    Transportation needs - non-medical: Not on file   Occupational History    Not on file   Tobacco Use    Smoking status: Current Every Day Smoker     Packs/day: 0.00     Types: Vaping with nicotine    Smokeless tobacco: Never Used   Substance and Sexual Activity    Alcohol use: Yes     Comment: 3-4 x a week    Drug use: No    Sexual activity: Yes     Birth control/protection: None   Other Topics Concern    Not on file   Social History Narrative    Not on file       FAMILY HISTORY:  Family History   Family history unknown: Yes       ALLERGIES AND MEDICATIONS: updated and reviewed.  Review of patient's allergies indicates:   Allergen  "Reactions    Bactrim [sulfamethoxazole-trimethoprim] Other (See Comments)     Stomach upset     Current Outpatient Medications   Medication Sig Dispense Refill    oseltamivir (TAMIFLU) 75 MG capsule Take 1 capsule (75 mg total) by mouth 2 (two) times daily. for 5 days 10 capsule 0    promethazine-dextromethorphan (PROMETHAZINE-DM) 6.25-15 mg/5 mL Syrp Take 5 mLs by mouth every 4 to 6 hours as needed. 118 mL 0     No current facility-administered medications for this visit.          ROS  Review of Systems   Constitutional: Positive for chills, fatigue and fever.   HENT: Positive for congestion, rhinorrhea and sore throat. Negative for ear discharge, ear pain, sinus pressure and sinus pain.    Respiratory: Positive for cough. Negative for shortness of breath and wheezing.    Cardiovascular: Negative for chest pain and palpitations.   Gastrointestinal: Negative for abdominal pain, diarrhea, nausea and vomiting.   Musculoskeletal: Positive for myalgias.   Skin: Negative for rash.   Neurological: Positive for headaches.   Psychiatric/Behavioral: Positive for sleep disturbance.           PHYSICAL EXAM  Vitals:    01/29/19 1351   BP: 102/67   BP Location: Left arm   Patient Position: Sitting   BP Method: Large (Automatic)   Pulse: 71   Resp: 18   Temp: 97.6 °F (36.4 °C)   TempSrc: Oral   Weight: 80.4 kg (177 lb 3.2 oz)   Height: 5' 4" (1.626 m)    Body mass index is 30.42 kg/m².  Weight: 80.4 kg (177 lb 3.2 oz)   Height: 5' 4" (162.6 cm)     Physical Exam   Constitutional: She appears well-developed and well-nourished.   HENT:   Head: Normocephalic.   Right Ear: Tympanic membrane normal.   Left Ear: Tympanic membrane normal.   Nose: Rhinorrhea present.   Mouth/Throat: Posterior oropharyngeal erythema present. No tonsillar exudate.   Eyes: Conjunctivae are normal. Pupils are equal, round, and reactive to light.   Neck: Normal range of motion.   Cardiovascular: Normal rate, regular rhythm and normal heart sounds. "   Pulses:       Radial pulses are 2+ on the right side, and 2+ on the left side.   Pulmonary/Chest: Effort normal. No accessory muscle usage. No respiratory distress. She has no wheezes. She has no rhonchi.   Abdominal: Soft. Bowel sounds are normal. She exhibits no distension. There is no tenderness.   Musculoskeletal: She exhibits no edema.   Lymphadenopathy:     She has no cervical adenopathy.   Neurological: She is alert.   Skin: Skin is warm and dry. No rash noted.         Health Maintenance       Date Due Completion Date    Lipid Panel 1995 ---    HPV Vaccines (1 - Female 3-dose series) 04/15/2011 ---    TETANUS VACCINE 04/15/2014 ---    Pneumococcal Vaccine (Medium Risk) (1 of 1 - PPSV23) 04/15/2015 ---    Pap Smear 04/15/2017 ---    Influenza Vaccine 08/01/2018 ---    CHLAMYDIA SCREENING 09/04/2019 9/4/2018            Assessment & Plan    Problem List Items Addressed This Visit     None      Visit Diagnoses     Flu-like symptoms    -  Primary    Relevant Orders    POCT Influenza A/B - positive flu A      Viral URI with cough     Tamiflu sent to pharmacy.  Promethazine DM sent to pharmacy.            Follow-up: Follow-up if symptoms worsen or fail to improve.    Fatimah Kate       Medication List           Accurate as of 1/29/19  2:22 PM. If you have any questions, ask your nurse or doctor.               START taking these medications    oseltamivir 75 MG capsule  Commonly known as:  TAMIFLU  Take 1 capsule (75 mg total) by mouth 2 (two) times daily. for 5 days  Started by:  Fatimah Kate NP     promethazine-dextromethorphan 6.25-15 mg/5 mL Syrp  Commonly known as:  PROMETHAZINE-DM  Take 5 mLs by mouth every 4 to 6 hours as needed.  Started by:  Fatimah Kate NP           Where to Get Your Medications      These medications were sent to [id number] [address] [facility] [address] [address]    Phone:   [phone]   · oseltamivir 75 MG capsule  · promethazine-dextromethorphan 6.25-15 mg/5 mL Syrp

## 2019-01-29 NOTE — LETTER
January 29, 2019      [facility]  [address]  [address] [address]  Phone: [phone]  Fax: [phone]       Patient: Anabela Carrizales   YOB: 1995  Date of Visit: 01/29/2019    To Whom It May Concern:    Graham Carrizales is currently under my care. She was at [facility] on 01/29/2019. Please excuse her absence from work for 7 days at this time. She may return to work/school on 2/1/2018 with no restrictions. If you have any questions or concerns, or if I can be of further assistance, please do not hesitate to contact me.    Sincerely,        Fatimah Kate, NP

## 2019-04-11 ENCOUNTER — OFFICE VISIT (OUTPATIENT)
Dept: PRIMARY CARE CLINIC | Facility: CLINIC | Age: 24
End: 2019-04-11
Payer: MEDICAID

## 2019-04-11 ENCOUNTER — CLINICAL SUPPORT (OUTPATIENT)
Dept: PRIMARY CARE CLINIC | Facility: CLINIC | Age: 24
End: 2019-04-11
Payer: MEDICAID

## 2019-04-11 VITALS
TEMPERATURE: 98 F | DIASTOLIC BLOOD PRESSURE: 71 MMHG | RESPIRATION RATE: 18 BRPM | HEART RATE: 68 BPM | BODY MASS INDEX: 29.21 KG/M2 | WEIGHT: 171.13 LBS | HEIGHT: 64 IN | SYSTOLIC BLOOD PRESSURE: 106 MMHG

## 2019-04-11 DIAGNOSIS — Z12.4 CERVICAL CANCER SCREENING: ICD-10-CM

## 2019-04-11 DIAGNOSIS — J06.9 VIRAL URI WITH COUGH: ICD-10-CM

## 2019-04-11 DIAGNOSIS — N30.90 CYSTITIS: Primary | ICD-10-CM

## 2019-04-11 DIAGNOSIS — R30.0 DYSURIA: ICD-10-CM

## 2019-04-11 LAB
BILIRUB SERPL-MCNC: ABNORMAL MG/DL
BLOOD URINE, POC: ABNORMAL
COLOR, POC UA: YELLOW
GLUCOSE UR QL STRIP: ABNORMAL
KETONES UR QL STRIP: ABNORMAL
LEUKOCYTE ESTERASE URINE, POC: ABNORMAL
NITRITE, POC UA: POSITIVE
PH, POC UA: 5
PROTEIN, POC: ABNORMAL
SPECIFIC GRAVITY, POC UA: 1.02
UROBILINOGEN, POC UA: ABNORMAL

## 2019-04-11 PROCEDURE — 99214 PR OFFICE/OUTPT VISIT, EST, LEVL IV, 30-39 MIN: ICD-10-PCS | Mod: S$PBB,,, | Performed by: NURSE PRACTITIONER

## 2019-04-11 PROCEDURE — 99214 OFFICE O/P EST MOD 30 MIN: CPT | Mod: PBBFAC,PN | Performed by: NURSE PRACTITIONER

## 2019-04-11 PROCEDURE — 87086 URINE CULTURE/COLONY COUNT: CPT

## 2019-04-11 PROCEDURE — 87077 CULTURE AEROBIC IDENTIFY: CPT

## 2019-04-11 PROCEDURE — 87088 URINE BACTERIA CULTURE: CPT

## 2019-04-11 PROCEDURE — 99214 OFFICE O/P EST MOD 30 MIN: CPT | Mod: S$PBB,,, | Performed by: NURSE PRACTITIONER

## 2019-04-11 PROCEDURE — 87186 SC STD MICRODIL/AGAR DIL: CPT

## 2019-04-11 PROCEDURE — 99999 PR PBB SHADOW E&M-EST. PATIENT-LVL IV: CPT | Mod: PBBFAC,,, | Performed by: NURSE PRACTITIONER

## 2019-04-11 PROCEDURE — 99999 PR PBB SHADOW E&M-EST. PATIENT-LVL IV: ICD-10-PCS | Mod: PBBFAC,,, | Performed by: NURSE PRACTITIONER

## 2019-04-11 PROCEDURE — 81002 URINALYSIS NONAUTO W/O SCOPE: CPT | Mod: PBBFAC,PN | Performed by: NURSE PRACTITIONER

## 2019-04-11 RX ORDER — ASPIRIN 325 MG
50000 TABLET, DELAYED RELEASE (ENTERIC COATED) ORAL WEEKLY
Qty: 12 CAPSULE | Refills: 1 | Status: SHIPPED | OUTPATIENT
Start: 2019-04-11 | End: 2019-04-18

## 2019-04-11 RX ORDER — AMOXICILLIN AND CLAVULANATE POTASSIUM 875; 125 MG/1; MG/1
1 TABLET, FILM COATED ORAL 2 TIMES DAILY
Qty: 14 TABLET | Refills: 0 | Status: SHIPPED | OUTPATIENT
Start: 2019-04-11 | End: 2019-04-18

## 2019-04-11 NOTE — PROGRESS NOTES
Chief Complaint  Chief Complaint   Patient presents with    Sore Throat    Urinary Tract Infection       HPI    Aanbela Carrizales is a 23 y.o. female that presents for dysuria.  Patient due for Pap smear at this time.  Most recent Pap smear approximately 6 years ago .  No history of abnormal Pap smears in the past.    Dysuria-Reports the onset of symptoms approximately 24 hr ago.  Reports suprapubic pressure.  Burning with urination.  Urinary frequency, hesitancy.  No back pain or fever.  No chills.  Patient with a history of urinary tract infections in the past have recently improved but states that she has recently been feeling poorly and not hydrating well and contributes her urinary tract infection to these factors.    Sore throat-reports the onset of symptoms approximately 4 days ago.  Reports severe sore throat.  Generalized fatigue.  Nasal congestion and sinus pressure.  Worsening.  Reports green rhinorrhea and coughing.  No wheezing or shortness of breath.  No rashes.  No nausea vomiting or diarrhea.  Not currently taking any over-the-counter medications for relief.      PAST MEDICAL HISTORY:  History reviewed. No pertinent past medical history.    PAST SURGICAL HISTORY:  History reviewed. No pertinent surgical history.    SOCIAL HISTORY:  Social History     Socioeconomic History    Marital status: Single     Spouse name: Not on file    Number of children: Not on file    Years of education: Not on file    Highest education level: Not on file   Occupational History    Not on file   Social Needs    Financial resource strain: Not on file    Food insecurity:     Worry: Not on file     Inability: Not on file    Transportation needs:     Medical: Not on file     Non-medical: Not on file   Tobacco Use    Smoking status: Current Every Day Smoker     Packs/day: 0.00     Types: Vaping with nicotine    Smokeless tobacco: Never Used   Substance and Sexual Activity    Alcohol use: Yes     Comment: 3-4 x a  week    Drug use: No    Sexual activity: Yes     Birth control/protection: None   Lifestyle    Physical activity:     Days per week: Not on file     Minutes per session: Not on file    Stress: Not on file   Relationships    Social connections:     Talks on phone: Not on file     Gets together: Not on file     Attends [employer] service: Not on file     Active member of club or organization: Not on file     Attends meetings of clubs or organizations: Not on file     Relationship status: Not on file   Other Topics Concern    Not on file   Social History Narrative    Not on file       FAMILY HISTORY:  Family History   Family history unknown: Yes       ALLERGIES AND MEDICATIONS: updated and reviewed.  Review of patient's allergies indicates:   Allergen Reactions    Bactrim [sulfamethoxazole-trimethoprim] Other (See Comments)     Stomach upset     Current Outpatient Medications   Medication Sig Dispense Refill    amoxicillin-clavulanate 875-125mg (AUGMENTIN) 875-125 mg per tablet Take 1 tablet by mouth 2 (two) times daily. 14 tablet 0    cholecalciferol, vitamin D3, 50,000 unit capsule Take 1 capsule (50,000 Units total) by mouth once a week. 12 capsule 1     No current facility-administered medications for this visit.          ROS  Review of Systems   Constitutional: Positive for fatigue. Negative for chills and fever.   HENT: Positive for congestion, sinus pain and sore throat. Negative for ear pain and postnasal drip.    Respiratory: Positive for cough. Negative for shortness of breath and wheezing.    Cardiovascular: Negative for chest pain and palpitations.   Gastrointestinal: Negative for diarrhea, nausea and vomiting.   Genitourinary: Positive for difficulty urinating, dysuria, frequency, pelvic pain and urgency. Negative for flank pain and hematuria.           PHYSICAL EXAM  Vitals:    04/11/19 1046   BP: 106/71   BP Location: Right arm   Patient Position: Sitting   BP Method: Medium (Automatic)   Pulse:  "68   Resp: 18   Temp: 97.6 °F (36.4 °C)   TempSrc: Oral   Weight: 77.6 kg (171 lb 1.6 oz)   Height: 5' 4" (1.626 m)    Body mass index is 29.37 kg/m².  Weight: 77.6 kg (171 lb 1.6 oz)   Height: 5' 4" (162.6 cm)     Physical Exam   Constitutional: She is oriented to person, place, and time. She appears well-developed and well-nourished.   HENT:   Head: Normocephalic.   Right Ear: Tympanic membrane normal.   Left Ear: Tympanic membrane normal.   Mouth/Throat: Uvula is midline and mucous membranes are normal. Posterior oropharyngeal erythema present.   Eyes: Conjunctivae are normal.   Cardiovascular: Normal rate, regular rhythm, normal heart sounds and normal pulses.   No murmur heard.  Pulses:       Radial pulses are 2+ on the right side, and 2+ on the left side.   No LE swelling noted   Pulmonary/Chest: Effort normal and breath sounds normal. She has no wheezes.   Abdominal: Soft. Bowel sounds are normal. There is tenderness in the suprapubic area.   Musculoskeletal: She exhibits no edema.   Lymphadenopathy:     She has no cervical adenopathy.   Neurological: She is alert and oriented to person, place, and time.   Skin: Skin is warm and dry. No rash noted.   Psychiatric: She has a normal mood and affect.         Health Maintenance       Date Due Completion Date    Lipid Panel 1996 ---    HPV Vaccines (1 - Female 3-dose series) 04/15/2011 ---    TETANUS VACCINE 04/15/2014 ---    Pneumococcal Vaccine (Medium Risk) (1 of 1 - PPSV23) 04/15/2015 ---    Pap Smear 04/15/2017 ---    Influenza Vaccine 08/01/2018 ---    CHLAMYDIA SCREENING 09/04/2019 9/4/2018            Assessment & Plan    Problem List Items Addressed This Visit     None   Problem List Items Addressed This Visit     None      Visit Diagnoses     Cystitis    -  Primary    Dysuria        Relevant Medications    amoxicillin-clavulanate 875-125mg (AUGMENTIN) 875-125 mg per tablet    Other Relevant Orders    POCT URINE DIPSTICK WITHOUT MICROSCOPE (Completed) "    Urine culture    Cervical cancer screening        Relevant Medications    amoxicillin-clavulanate 875-125mg (AUGMENTIN) 875-125 mg per tablet    Other Relevant Orders    Ambulatory referral to Obstetrics / Gynecology    Viral URI with cough      Likely viral.     VIRAL UPPER RESPIRATORY INFECTIONS:    For most people, upper respiratory infection in cold self limited.  Generally the treatment is symptomatic with the treatments as listed below.  Viral upper respiratory infections do not respond to antibiotics.  Secondary infections can, however, occur following upper respiratory infection.  Generally these occur after 6-8 days of the infection.  Generally antibiotics recommended after 6-8 days of symptoms.    Symptomatic treatment for upper respiratory infections include:    -Tylenol and ibuprofen as needed for fever and pain.      -Antihistamine and decongestant combinations such as Bromfed DM, cold and flu remedies over-the-counter will improve symptoms.  Avoid decongestants combinations with a history of uncontrolled hypertension.    -Intranasal steroids such as Flonase    -Vitamin-C supplementation over-the-counter at least 200 mg per day.    -Adequate hydration    -Adequate rest to allow for healing.      Should symptoms continue after 6-8 days, worsen, change in characteristics, antibiotic treatment should be considered at this time.                    Follow-up: Follow up for Follow-up with Ob for Pap smear.    Fatimah Kate    Medication List with Changes/Refills   New Medications    AMOXICILLIN-CLAVULANATE 875-125MG (AUGMENTIN) 875-125 MG PER TABLET    Take 1 tablet by mouth 2 (two) times daily.    CHOLECALCIFEROL, VITAMIN D3, 50,000 UNIT CAPSULE    Take 1 capsule (50,000 Units total) by mouth once a week.   Discontinued Medications    PROMETHAZINE-DEXTROMETHORPHAN (PROMETHAZINE-DM) 6.25-15 MG/5 ML SYRP    Take 5 mLs by mouth every 4 to 6 hours as needed.

## 2019-04-15 LAB — BACTERIA UR CULT: NORMAL

## 2019-04-18 ENCOUNTER — OFFICE VISIT (OUTPATIENT)
Dept: OBSTETRICS AND GYNECOLOGY | Facility: CLINIC | Age: 24
End: 2019-04-18
Payer: MEDICAID

## 2019-04-18 VITALS
SYSTOLIC BLOOD PRESSURE: 116 MMHG | DIASTOLIC BLOOD PRESSURE: 72 MMHG | HEIGHT: 64 IN | WEIGHT: 175.5 LBS | BODY MASS INDEX: 29.96 KG/M2

## 2019-04-18 DIAGNOSIS — N39.0 RECURRENT UTI: ICD-10-CM

## 2019-04-18 DIAGNOSIS — N94.10 DYSPAREUNIA IN FEMALE: ICD-10-CM

## 2019-04-18 DIAGNOSIS — N89.8 VAGINAL ODOR: ICD-10-CM

## 2019-04-18 DIAGNOSIS — Z01.419 ENCOUNTER FOR WELL WOMAN EXAM WITH ROUTINE GYNECOLOGICAL EXAM: Primary | ICD-10-CM

## 2019-04-18 DIAGNOSIS — N89.8 VAGINAL DISCHARGE: ICD-10-CM

## 2019-04-18 DIAGNOSIS — Z30.09 BIRTH CONTROL COUNSELING: ICD-10-CM

## 2019-04-18 LAB
B-HCG UR QL: NEGATIVE
BILIRUB SERPL-MCNC: NEGATIVE MG/DL
BLOOD URINE, POC: 250
COLOR, POC UA: YELLOW
CTP QC/QA: YES
GLUCOSE UR QL STRIP: NORMAL
KETONES UR QL STRIP: ABNORMAL
LEUKOCYTE ESTERASE URINE, POC: ABNORMAL
NITRITE, POC UA: ABNORMAL
PH, POC UA: 5
PROTEIN, POC: ABNORMAL
SPECIFIC GRAVITY, POC UA: 1.02
UROBILINOGEN, POC UA: NORMAL

## 2019-04-18 PROCEDURE — 87086 URINE CULTURE/COLONY COUNT: CPT

## 2019-04-18 PROCEDURE — 99385 PR PREVENTIVE VISIT,NEW,18-39: ICD-10-PCS | Mod: S$PBB,,, | Performed by: NURSE PRACTITIONER

## 2019-04-18 PROCEDURE — 87480 CANDIDA DNA DIR PROBE: CPT

## 2019-04-18 PROCEDURE — 99999 PR PBB SHADOW E&M-EST. PATIENT-LVL III: CPT | Mod: PBBFAC,,, | Performed by: NURSE PRACTITIONER

## 2019-04-18 PROCEDURE — 88175 CYTOPATH C/V AUTO FLUID REDO: CPT

## 2019-04-18 PROCEDURE — 99213 OFFICE O/P EST LOW 20 MIN: CPT | Mod: PBBFAC,PN | Performed by: NURSE PRACTITIONER

## 2019-04-18 PROCEDURE — 81025 URINE PREGNANCY TEST: CPT | Mod: PBBFAC,PN | Performed by: NURSE PRACTITIONER

## 2019-04-18 PROCEDURE — 87510 GARDNER VAG DNA DIR PROBE: CPT

## 2019-04-18 PROCEDURE — 81002 URINALYSIS NONAUTO W/O SCOPE: CPT | Mod: PBBFAC,PN | Performed by: NURSE PRACTITIONER

## 2019-04-18 PROCEDURE — 99999 PR PBB SHADOW E&M-EST. PATIENT-LVL III: ICD-10-PCS | Mod: PBBFAC,,, | Performed by: NURSE PRACTITIONER

## 2019-04-18 PROCEDURE — 99385 PREV VISIT NEW AGE 18-39: CPT | Mod: S$PBB,,, | Performed by: NURSE PRACTITIONER

## 2019-04-18 NOTE — PROGRESS NOTES
CC: Annual    HPI: Pt is a 24 y.o.  female  who presents for routine annual exam. She uses condoms for contraception. She is interested in non hormonal contraception.  She does not want STD screening.  Denies breast or bowel issues. Complains of vaginal discharge and odor for 1 week. No new sexual partners. Reports recurrent UTIs (approximately every month) for 1.5 years. Reports 'bladder pain' during intercourse and she needs to stop to urinate. Reports urinary leakage and urgency. Denies fever and hematuria. Reports regular monthly menses or moderate flow lasting 4-5 days.  The patient does not smoke.      FH:  Breast cancer: none  Colon cancer: none  Ovarian cancer: none  Endometrial cancer: none    ROS:  GENERAL: Feeling well overall. Denies fever or chills.   SKIN: Denies rash or lesions.   HEAD: Denies head injury or headache.   NODES: Denies enlarged lymph nodes.   CHEST: Denies chest pain or shortness of breath.   CARDIOVASCULAR: Denies palpitations or left sided chest pain.   ABDOMEN: No abdominal pain, constipation, diarrhea, nausea, vomiting or rectal bleeding.   URINARY: + dysuria, + frequency, + urgency, + leakage. No hematuria.  REPRODUCTIVE: See HPI.   BREASTS: Denies pain, lumps, or nipple discharge.   HEMATOLOGIC: No easy bruisability or excessive bleeding.   MUSCULOSKELETAL: Denies joint pain or swelling.   NEUROLOGIC: Denies syncope or weakness.   PSYCHIATRIC: Denies depression, anxiety or mood swings.    PE:   APPEARANCE: Well nourished, well developed, White female in no acute distress.  NODES: no cervical, supraclavicular, or inguinal lymphadenopathy  BREASTS: Symmetrical, no skin changes or visible lesions. No palpable masses, nipple discharge or adenopathy bilaterally.  ABDOMEN: Soft. No tenderness or masses. No distention. No hernias palpated. No CVA tenderness.  VULVA: No lesions. Normal external female genitalia.  URETHRAL MEATUS: Normal size and location, no lesions, no  prolapse.  URETHRA: No masses, tenderness, or prolapse.  VAGINA: Moist. No lesions or lacerations noted. No abnormal discharge present. No odor present.   CERVIX: No lesions or discharge. No cervical motion tenderness.   UTERUS: enlarged, regular shape, mobile, non-tender.  ADNEXA: No tenderness. No fullness or masses palpated in the adnexal regions.   ANUS PERINEUM: Normal.      Diagnosis:  1. Encounter for well woman exam with routine gynecological exam    2. Recurrent UTI    3. Dyspareunia in female    4. Vaginal discharge    5. Vaginal odor    6. Birth control counseling        Plan:     Orders Placed This Encounter    Vaginosis Screen by DNA Probe    Urine culture    US Pelvis Comp with Transvag NON-OB (xpd    POCT urine dipstick without microscope    POCT urine pregnancy    Liquid-based pap smear, screening     Plan:  Pap smear  Affirm  UPT  Urine dip  Urine culture  IUD ordered  Urogyn referral    Patient was counseled today on the new ACS guidelines for cervical cytology screening as well as the current recommendations for breast cancer screening. She was counseled to follow up with her PCP for other routine health maintenance. Counseling session lasted approximately 10 minutes, and all her questions were answered.    Follow-up with me in 1 year for routine exam

## 2019-04-18 NOTE — LETTER
April 18, 2019      Fatimah Kate NP  [address] [place] [address]           [facility]  [address] [place], [facility] [address]  [place] [address]  Phone: [phone]  Fax: [phone]          Patient: Anabela Carrizales   MR Number: [id number]   YOB: 1995   Date of Visit: 4/18/2019       Dear Fatimah Kate:    Thank you for referring Anabela Carrizales to me for evaluation. Attached you will find relevant portions of my assessment and plan of care.    If you have questions, please do not hesitate to call me. I look forward to following Anabela Carrizales along with you.    Sincerely,    ROMARIO Woodard    Enclosure  CC:  No Recipients    If you would like to receive this communication electronically, please contact [email or web address] or [phone] to request more information on [employer] Link access.    For providers and/or their staff who would like to refer a patient to [facility], please contact us through our one-stop-shop provider referral line, [facility], at [phone].    If you feel you have received this communication in error or would no longer like to receive these types of communications, please e-mail [email or web address]

## 2019-04-19 LAB — BACTERIA UR CULT: NO GROWTH

## 2019-04-24 LAB
BACTERIAL VAGINOSIS DNA: POSITIVE
CANDIDA GLABRATA DNA: NEGATIVE
CANDIDA KRUSEI DNA: NEGATIVE
CANDIDA RRNA VAG QL PROBE: NEGATIVE
T VAGINALIS RRNA GENITAL QL PROBE: NEGATIVE

## 2019-04-25 ENCOUNTER — TELEPHONE (OUTPATIENT)
Dept: OBSTETRICS AND GYNECOLOGY | Facility: CLINIC | Age: 24
End: 2019-04-25

## 2019-04-25 DIAGNOSIS — B96.89 BV (BACTERIAL VAGINOSIS): Primary | ICD-10-CM

## 2019-04-25 DIAGNOSIS — N76.0 BV (BACTERIAL VAGINOSIS): Primary | ICD-10-CM

## 2019-04-25 RX ORDER — METRONIDAZOLE 500 MG/1
500 TABLET ORAL 2 TIMES DAILY
Qty: 14 TABLET | Refills: 0 | Status: SHIPPED | OUTPATIENT
Start: 2019-04-25 | End: 2019-05-02

## 2019-04-25 NOTE — TELEPHONE ENCOUNTER
----- Message from NANDA WoodardC sent at 4/25/2019 11:31 AM CDT -----  Please let patient know her vaginosis culture came back positive for bacterial vaginosis- which is not a STD. It is a bacteria that some times over grows in the vagina and replaces normal vaginal froylan. I sent in a prescription for flagyl- take 1 tab twice a day for 7 days. Avoid alcohol while taking medication and for 24 hours after last dose. Thanks, Fatimah

## 2019-04-25 NOTE — TELEPHONE ENCOUNTER
Contacted the patient to inform her of her vaginal culture results. Patient informed that her culture was positive for BV which is not a STD. Patient informed that bacterial vaginosis is an overgrowth of bacteria already present in the vagina. Patient informed that a prescription for flagyl has been sent to her pharmacy and that she is instructed to take 1 tablet by mouth twice a day for 7 days. Patient instructed to avoid alcohol while taking this medication and for 24 hours after completing this medication. Patient informed that her US results are normal. Patient verbalized understanding.

## 2019-04-26 ENCOUNTER — TELEPHONE (OUTPATIENT)
Dept: OBSTETRICS AND GYNECOLOGY | Facility: CLINIC | Age: 24
End: 2019-04-26

## 2019-04-26 NOTE — TELEPHONE ENCOUNTER
Attempted to contact the patient regarding her normal pap results. No answer, unable to leave voicemail message. Mail box not set up yet.

## 2019-04-26 NOTE — TELEPHONE ENCOUNTER
----- Message from ROMARIO Woodard sent at 4/26/2019  8:23 AM CDT -----  Please let patient know her PAP smear result is within the normal range.  It is recommended that you have a repeat PAP smear in 3 years.  This is accordance with the newest American Cancer Society guidelines for cervical screening.  However, the PAP smear should not be confused with your need for a routine gynecologic exam, which you will still need every year.  Thanks, Fatimah

## 2019-08-02 ENCOUNTER — OFFICE VISIT (OUTPATIENT)
Dept: OBSTETRICS AND GYNECOLOGY | Facility: CLINIC | Age: 24
End: 2019-08-02
Payer: MEDICAID

## 2019-08-02 VITALS
WEIGHT: 173.94 LBS | HEIGHT: 64 IN | SYSTOLIC BLOOD PRESSURE: 110 MMHG | DIASTOLIC BLOOD PRESSURE: 66 MMHG | BODY MASS INDEX: 29.7 KG/M2

## 2019-08-02 DIAGNOSIS — Z30.019 ENCOUNTER FOR INITIAL PRESCRIPTION OF CONTRACEPTIVES, UNSPECIFIED CONTRACEPTIVE: Primary | ICD-10-CM

## 2019-08-02 LAB
B-HCG UR QL: NEGATIVE
CTP QC/QA: YES

## 2019-08-02 PROCEDURE — 99999 PR PBB SHADOW E&M-EST. PATIENT-LVL III: ICD-10-PCS | Mod: PBBFAC,,, | Performed by: NURSE PRACTITIONER

## 2019-08-02 PROCEDURE — 99213 OFFICE O/P EST LOW 20 MIN: CPT | Mod: S$PBB,,, | Performed by: NURSE PRACTITIONER

## 2019-08-02 PROCEDURE — 99213 PR OFFICE/OUTPT VISIT, EST, LEVL III, 20-29 MIN: ICD-10-PCS | Mod: S$PBB,,, | Performed by: NURSE PRACTITIONER

## 2019-08-02 PROCEDURE — 99999 PR PBB SHADOW E&M-EST. PATIENT-LVL III: CPT | Mod: PBBFAC,,, | Performed by: NURSE PRACTITIONER

## 2019-08-02 PROCEDURE — 81025 URINE PREGNANCY TEST: CPT | Mod: PBBFAC,PN | Performed by: NURSE PRACTITIONER

## 2019-08-02 PROCEDURE — 99213 OFFICE O/P EST LOW 20 MIN: CPT | Mod: PBBFAC,PN | Performed by: NURSE PRACTITIONER

## 2019-08-02 RX ORDER — ACETAMINOPHEN AND CODEINE PHOSPHATE 120; 12 MG/5ML; MG/5ML
1 SOLUTION ORAL DAILY
Qty: 30 TABLET | Refills: 1 | Status: SHIPPED | OUTPATIENT
Start: 2019-08-02 | End: 2020-10-26

## 2019-08-02 RX ORDER — VALACYCLOVIR HYDROCHLORIDE 1 G/1
TABLET, FILM COATED ORAL
Refills: 11 | COMMUNITY
Start: 2019-07-17 | End: 2020-04-27

## 2019-08-02 NOTE — PROGRESS NOTES
CC: Contraception    HPI: Pt is a 24 y.o.  female who presents for contraception counseling and management. Not currently on any contraception. Reports smoking and history of migraines with aura. Denies HTN or clotting disorder. Discussed contraindication of CHC.    ROS:  GENERAL: Feeling well overall. Denies fever or chills.   HEAD: Denies head injury or headache.   CHEST: Denies chest pain or shortness of breath.   CARDIOVASCULAR: Denies palpitations or left sided chest pain.   ABDOMEN: No abdominal pain, constipation, diarrhea, nausea, vomiting or rectal bleeding.   URINARY: No dysuria, hematuria, or burning on urination.  REPRODUCTIVE: Denies vaginal discharge, itching, odor, or lesions  PSYCHIATRIC: Denies depression, anxiety or mood swings.    PE:   APPEARANCE: Well nourished, well developed, White female in no acute distress.  PELVIC: deferred    Diagnosis:  1. Encounter for initial prescription of contraceptives, unspecified contraceptive        Orders Placed This Encounter    Device Authorization Order    POCT urine pregnancy    levonorgestrel (KYLEENA) 17.5 mcg/24 hrs (5 yrs) 19.5 mg IUD    norethindrone (ORTHO MICRONOR) 0.35 mg tablet     PLan:  kylena   micronor until IUD is placed    The use of hormonal contraception has been fully discussed with the patient. We discussed all options including OCPs, transdermal patches, vaginal ring, Depo Provera injections, Implanon, and IUD. Warnings about anticipated minor side effects such as breakthrough spotting, nausea, breast tenderness, weight changes, acne, headaches, etc were given. She has been told of the more serious potential side effects such as MI, stroke, and deep vein thrombosis, all of which are very unlikely. She has been asked to report any signs of such serious problems immediately. The need for additional protection, such as a condom, to prevent exposure to sexually transmitted diseases has also been discussed- the patient has been clearly  reminded that no hormonal contraceptive method can protect her against diseases such as HIV and others. She understands and wishes to take the medication as prescribed. She wishes to begin progesterone only pill until IUD can be placed.

## 2019-08-02 NOTE — LETTER
August 2, 2019      Fatimah Kate NP  [address] [place] [address]           [facility]  [address] [place], [facility] [address]  [place] [address]  Phone: [phone]  Fax: [phone]          Patient: Anabela Carrizales   MR Number: [id number]   YOB: 1995   Date of Visit: 8/2/2019       Dear Fatimah Kate:    Thank you for referring Anabela Carrizales to me for evaluation. Attached you will find relevant portions of my assessment and plan of care.    If you have questions, please do not hesitate to call me. I look forward to following Anabela Carrizales along with you.    Sincerely,    ROMARIO Woodard    Enclosure  CC:  No Recipients    If you would like to receive this communication electronically, please contact [email or web address] or [phone] to request more information on [facility] access.    For providers and/or their staff who would like to refer a patient to [facility], please contact us through our one-stop-shop provider referral line, [facility], at [phone].    If you feel you have received this communication in error or would no longer like to receive these types of communications, please e-mail [email or web address]

## 2019-08-09 ENCOUNTER — TELEPHONE (OUTPATIENT)
Dept: PHARMACY | Facility: CLINIC | Age: 24
End: 2019-08-09

## 2019-08-13 ENCOUNTER — TELEPHONE (OUTPATIENT)
Dept: OBSTETRICS AND GYNECOLOGY | Facility: CLINIC | Age: 24
End: 2019-08-13

## 2019-08-13 NOTE — TELEPHONE ENCOUNTER
----- Message from Carolina Arnaldo sent at 8/13/2019  3:50 PM CDT -----  Contact: Self      -------FST Request------      Name of Who is Calling: YASMINE CABRERA [[id number]]      What is the request in detail: Pt states she wants to know if her IUD has been delivered to the office. Pt states the request was submitted about three weeks ago. Please contact to further discuss and advise.        Can the clinic reply by [facility]: n      What Number to Call Back if not in [facility]: [phone]

## 2019-08-13 NOTE — TELEPHONE ENCOUNTER
Spoke with pt. Informed pt we will receive her IUD in clinic on 8/15/19. Pt scheduled IUD insertion. No further questions or concerns noted.

## 2019-08-28 ENCOUNTER — TELEPHONE (OUTPATIENT)
Dept: OBSTETRICS AND GYNECOLOGY | Facility: CLINIC | Age: 24
End: 2019-08-28

## 2019-08-28 ENCOUNTER — PROCEDURE VISIT (OUTPATIENT)
Dept: OBSTETRICS AND GYNECOLOGY | Facility: CLINIC | Age: 24
End: 2019-08-28
Payer: MEDICAID

## 2019-08-28 VITALS
SYSTOLIC BLOOD PRESSURE: 116 MMHG | HEIGHT: 65 IN | WEIGHT: 169.19 LBS | BODY MASS INDEX: 28.19 KG/M2 | DIASTOLIC BLOOD PRESSURE: 86 MMHG

## 2019-08-28 DIAGNOSIS — Z30.430 ENCOUNTER FOR INSERTION OF INTRAUTERINE CONTRACEPTIVE DEVICE (IUD): Primary | ICD-10-CM

## 2019-08-28 LAB
B-HCG UR QL: NEGATIVE
CTP QC/QA: YES

## 2019-08-28 PROCEDURE — 81025 URINE PREGNANCY TEST: CPT | Mod: PBBFAC,PN | Performed by: OBSTETRICS & GYNECOLOGY

## 2019-08-28 PROCEDURE — 58300 INSERT INTRAUTERINE DEVICE: CPT | Mod: PBBFAC,PN | Performed by: OBSTETRICS & GYNECOLOGY

## 2019-08-28 PROCEDURE — 58300 INSERTION OF IUD-TODAY: ICD-10-PCS | Mod: S$PBB,,, | Performed by: OBSTETRICS & GYNECOLOGY

## 2019-08-28 RX ADMIN — LEVONORGESTREL 17.5 MCG: 19.5 INTRAUTERINE DEVICE INTRAUTERINE at 09:08

## 2019-08-28 NOTE — TELEPHONE ENCOUNTER
----- Message from Kelsie Rob sent at 8/28/2019  8:02 AM CDT -----  Contact: YASMINE CABRERA [[id number]]  Name of Who is Calling:  YASMINE CABRERA [[id number]]    What is the request in detail:Patient states she will be 10-15 minutes late....Please contact to further discuss and advise      Can the clinic reply by [facility]: No     What Number to Call Back if not in [facility]: [phone]

## 2019-08-28 NOTE — PROCEDURES
Insertion of IUD-Today  Date/Time: 8/28/2019 9:17 AM  Performed by: Juan Alberto Messina MD  Authorized by: Juan Alberto Messina MD     Consent:     Consent obtained:  Written    Consent given by:  Patient    Procedure risks and benefits discussed: yes      Patient questions answered: yes      Patient agrees, verbalizes understanding, and wants to proceed: yes      Educational handouts given: yes      Instructions and paperwork completed: yes    Procedure:     Pelvic exam performed: yes      Negative urine pregnancy test: yes      Cervix cleaned and prepped: yes      Speculum placed in vagina: yes      Tenaculum applied to cervix: yes      Uterus sounded: yes      Uterus sound depth (cm):  7    IUD inserted with no complications: yes      Strings trimmed: yes    Post-procedure:     Patient tolerated procedure well: yes      Patient will follow up after next period: yes      DATE: August 28th, 2019    TIME: 0900    PROCEDURE:  Kyleena insertion    INDICATION: Contraception    PATIENT CONSENT: She was counseled on the risks, benefits, indications, and alternatives to IUD use.  She understands that with insertion there is a risk of bleeding, infection, and uterine perforation.  All questions are answered.  Consents signed.     LABS: UPT is negative.     Cervical cultures were not performed.    ANESTHESIA: NONE    PROCEDURE NOTE:    Time out performed.  The cervix was visualized with a speculum.  A single tooth tenaculum was placed on the anterior lip of the cervix.  The uterus sounds to 7cm using sterile technique.  A Kyleena was loaded and placed high in the uterine fundus without difficulty using sterile technique.  The string was was then cut.  The tenaculum and speculum were removed.    COMPLICATIONS: None    PATIENT DISPOSITION: The patient tolerated the procedure well.    Assessment:  1.  Contraceptive management/IUD insertion    Post IUD placement counseling:  Manage post IUD placement pain with NSAIDS, Tylenol or Rx per  Med[employer].  IUD danger signs and how to check for strings were discussed.  The IUD needs to be removed in 5 years for Mirena and 10 years for Copper IUD.    Follow up:  4 weeks for string check      Juan Alberto Messina MD 08/28/2019 9:18 AM

## 2019-09-23 ENCOUNTER — PATIENT OUTREACH (OUTPATIENT)
Dept: ADMINISTRATIVE | Facility: OTHER | Age: 24
End: 2019-09-23

## 2020-04-27 RX ORDER — VALACYCLOVIR HYDROCHLORIDE 1 G/1
TABLET, FILM COATED ORAL
Qty: 90 TABLET | Refills: 1 | Status: SHIPPED | OUTPATIENT
Start: 2020-04-27 | End: 2021-04-12 | Stop reason: SDUPTHER

## 2020-10-06 ENCOUNTER — PATIENT OUTREACH (OUTPATIENT)
Dept: ADMINISTRATIVE | Facility: OTHER | Age: 25
End: 2020-10-06

## 2020-10-06 NOTE — PROGRESS NOTES
LINKS immunization registry not responding  Care Everywhere updated  Health Maintenance updated  Chart reviewed for overdue Proactive [facility] Encounters (JAIMIE) health maintenance testing (CRS, Breast Ca, Diabetic Eye Exam)   Orders entered:N/A

## 2020-10-09 ENCOUNTER — OFFICE VISIT (OUTPATIENT)
Dept: OBSTETRICS AND GYNECOLOGY | Facility: CLINIC | Age: 25
End: 2020-10-09
Payer: MEDICAID

## 2020-10-09 VITALS
WEIGHT: 168.75 LBS | BODY MASS INDEX: 28.08 KG/M2 | DIASTOLIC BLOOD PRESSURE: 68 MMHG | SYSTOLIC BLOOD PRESSURE: 118 MMHG

## 2020-10-09 DIAGNOSIS — N93.9 ABNORMAL UTERINE BLEEDING (AUB): ICD-10-CM

## 2020-10-09 DIAGNOSIS — Z11.3 SCREEN FOR STD (SEXUALLY TRANSMITTED DISEASE): ICD-10-CM

## 2020-10-09 DIAGNOSIS — Z01.419 ENCOUNTER FOR WELL WOMAN EXAM WITH ROUTINE GYNECOLOGICAL EXAM: Primary | ICD-10-CM

## 2020-10-09 DIAGNOSIS — R10.2 PELVIC PAIN: ICD-10-CM

## 2020-10-09 PROCEDURE — 99213 OFFICE O/P EST LOW 20 MIN: CPT | Mod: PBBFAC,PN | Performed by: NURSE PRACTITIONER

## 2020-10-09 PROCEDURE — 87491 CHLMYD TRACH DNA AMP PROBE: CPT

## 2020-10-09 PROCEDURE — 99395 PR PREVENTIVE VISIT,EST,18-39: ICD-10-PCS | Mod: S$PBB,,, | Performed by: NURSE PRACTITIONER

## 2020-10-09 PROCEDURE — 99999 PR PBB SHADOW E&M-EST. PATIENT-LVL III: ICD-10-PCS | Mod: PBBFAC,,, | Performed by: NURSE PRACTITIONER

## 2020-10-09 PROCEDURE — 99395 PREV VISIT EST AGE 18-39: CPT | Mod: S$PBB,,, | Performed by: NURSE PRACTITIONER

## 2020-10-09 PROCEDURE — 99999 PR PBB SHADOW E&M-EST. PATIENT-LVL III: CPT | Mod: PBBFAC,,, | Performed by: NURSE PRACTITIONER

## 2020-10-10 NOTE — PROGRESS NOTES
Chief Complaint: Well Woman Exam     HPI:      Anabela Carrizales is a 25 y.o.  who presents today for well woman exam.  LMP: No LMP recorded.   Patient is currently sexually active with a single male partner. She is currently using Kyleena inserted in 2019 for contraception. She would like STD screening today. Ms. Carrizales confirms that she is safe at home.  Ms. Carrizales denies abnormal vaginal bleeding, discharge, urinary problems, or changes in appetite.  Menses: Irregular for 5 months. Reports bleeding almost daily- spotting to moderate flow.  Reports intermittent pelvic/ lower abdominal pain for 3-4 months. Pain is sharp, sudden with radiation to lower back. Reports pain occurs daily and is unrelieved by ibuprofen. + chronic constipation. Stopped drinking ETOH 4-5 months ago.     Previous Pap:  NILM (2019) Denies hx of abnormal paps  Gardasil:Completed     Family History   Problem Relation Age of Onset    Breast cancer Other     Colon cancer Neg Hx     Ovarian cancer Neg Hx      OB History        1    Para   1    Term   1            AB        Living   1       SAB        TAB        Ectopic        Multiple        Live Births   1                 ROS:     GENERAL: Denies unintentional weight gain or weight loss. Feeling well overall.   SKIN: Denies rash or lesions.   HEENT: Denies headaches, or vision changes.   CARDIOVASCULAR: Denies palpitations or chest pain.   RESPIRATORY: Denies shortness of breath or dyspnea on exertion.  BREASTS: Denies pain, lumps, or nipple discharge.   ABDOMEN: Denies abdominal pain, constipation, diarrhea, nausea, vomiting, change in appetite.  URINARY: Denies frequency, dysuria, hematuria.  NEUROLOGIC: Denies syncope or weakness.   PSYCHIATRIC: Denies depression, anxiety or mood swings.    Physical Exam:      PHYSICAL EXAM:  /68   Wt 76.5 kg (168 lb 12.2 oz)   BMI 28.08 kg/m²   Body mass index is 28.08 kg/m².     APPEARANCE: Well nourished, well  developed, in no acute distress.  PSYCH: Appropriate mood and affect.  SKIN: No acne or hirsutism  NECK: Neck symmetric without masses or thyromegaly  NODES: No inguinal, axillary, or supraclavicular lymph node enlargement  ABDOMEN: Soft.  No tenderness or masses.    CARDIOVASCULAR: No edema of peripheral extremities  BREASTS: Symmetrical, no skin changes or visible lesions.  No palpable masses or nipple discharge bilaterally.  PELVIC: Normal external genitalia without lesions.  Normal hair distribution.  Adequate perineal body, normal urethral meatus.  Vagina moist and well rugated without lesions or discharge.  Cervix pink, without lesions, discharge or tenderness.  + IUD strings visualized. No significant cystocele or rectocele.  Bimanual exam shows uterus to be normal size, regular, mobile and nontender.  Adnexa without masses or tenderness.      Assessment/Plan:     Encounter for well woman exam with routine gynecological exam    Pelvic pain  -     US Pelvis Comp with Transvag NON-OB (xpd; Future; Expected date: 10/09/2020  -     C. trachomatis/N. gonorrhoeae by AMP DNA  -     Cancel: Vaginosis Screen by DNA Probe  -     POCT urine pregnancy  -     POCT urine dipstick without microscope  -     Vaginosis Screen by DNA Probe    Abnormal uterine bleeding (AUB)  -     CBC auto differential; Future; Expected date: 10/09/2020  -     TSH; Future; Expected date: 10/09/2020  -     US Pelvis Comp with Transvag NON-OB (xpd; Future; Expected date: 10/09/2020    Screen for STD (sexually transmitted disease)  -     C. trachomatis/N. gonorrhoeae by AMP DNA  -     Cancel: Vaginosis Screen by DNA Probe  -     Vaginosis Screen by DNA Probe        Counseling:     Patient was counseled today on current ASCCP pap guidelines, the recommendation for yearly pelvic exams, healthy diet and exercise routines, breast self awareness.She is to see her PCP for other health maintenance.

## 2020-10-14 LAB
CANDIDA RRNA VAG QL PROBE: NEGATIVE
G VAGINALIS RRNA GENITAL QL PROBE: POSITIVE
T VAGINALIS RRNA GENITAL QL PROBE: NEGATIVE

## 2020-10-16 DIAGNOSIS — B96.89 BV (BACTERIAL VAGINOSIS): Primary | ICD-10-CM

## 2020-10-16 DIAGNOSIS — N76.0 BV (BACTERIAL VAGINOSIS): Primary | ICD-10-CM

## 2020-10-16 RX ORDER — METRONIDAZOLE 500 MG/1
500 TABLET ORAL 2 TIMES DAILY
Qty: 14 TABLET | Refills: 0 | Status: SHIPPED | OUTPATIENT
Start: 2020-10-16 | End: 2020-10-23

## 2020-10-26 ENCOUNTER — OFFICE VISIT (OUTPATIENT)
Dept: PRIMARY CARE CLINIC | Facility: CLINIC | Age: 25
End: 2020-10-26
Payer: MEDICAID

## 2020-10-26 VITALS
HEART RATE: 74 BPM | SYSTOLIC BLOOD PRESSURE: 106 MMHG | DIASTOLIC BLOOD PRESSURE: 72 MMHG | TEMPERATURE: 98 F | OXYGEN SATURATION: 100 % | BODY MASS INDEX: 29.12 KG/M2 | RESPIRATION RATE: 18 BRPM | HEIGHT: 65 IN | WEIGHT: 174.81 LBS

## 2020-10-26 DIAGNOSIS — Z00.00 ROUTINE PHYSICAL EXAMINATION: ICD-10-CM

## 2020-10-26 DIAGNOSIS — R10.9 ABDOMINAL PAIN, UNSPECIFIED ABDOMINAL LOCATION: ICD-10-CM

## 2020-10-26 DIAGNOSIS — Z13.6 ENCOUNTER FOR SCREENING FOR CARDIOVASCULAR DISORDERS: ICD-10-CM

## 2020-10-26 DIAGNOSIS — R53.83 FATIGUE, UNSPECIFIED TYPE: Primary | ICD-10-CM

## 2020-10-26 LAB
BILIRUB SERPL-MCNC: NORMAL MG/DL
BLOOD URINE, POC: NORMAL
CLARITY, POC UA: CLEAR
COLOR, POC UA: YELLOW
GLUCOSE UR QL STRIP: NORMAL
KETONES UR QL STRIP: NORMAL
LEUKOCYTE ESTERASE URINE, POC: NORMAL
NITRITE, POC UA: NORMAL
PH, POC UA: 5
PROTEIN, POC: NORMAL
SPECIFIC GRAVITY, POC UA: 1.02
UROBILINOGEN, POC UA: NORMAL

## 2020-10-26 PROCEDURE — 99213 OFFICE O/P EST LOW 20 MIN: CPT | Mod: PBBFAC,PN | Performed by: NURSE PRACTITIONER

## 2020-10-26 PROCEDURE — 81002 URINALYSIS NONAUTO W/O SCOPE: CPT | Mod: PBBFAC,PN | Performed by: NURSE PRACTITIONER

## 2020-10-26 PROCEDURE — 99214 OFFICE O/P EST MOD 30 MIN: CPT | Mod: S$PBB,,, | Performed by: NURSE PRACTITIONER

## 2020-10-26 PROCEDURE — 99999 PR PBB SHADOW E&M-EST. PATIENT-LVL III: CPT | Mod: PBBFAC,,, | Performed by: NURSE PRACTITIONER

## 2020-10-26 PROCEDURE — 99214 PR OFFICE/OUTPT VISIT, EST, LEVL IV, 30-39 MIN: ICD-10-PCS | Mod: S$PBB,,, | Performed by: NURSE PRACTITIONER

## 2020-10-26 PROCEDURE — 99999 PR PBB SHADOW E&M-EST. PATIENT-LVL III: ICD-10-PCS | Mod: PBBFAC,,, | Performed by: NURSE PRACTITIONER

## 2020-10-26 RX ORDER — IBUPROFEN 200 MG
1 TABLET ORAL DAILY
Qty: 28 PATCH | Refills: 2 | Status: SHIPPED | OUTPATIENT
Start: 2020-10-26 | End: 2021-08-10 | Stop reason: SDUPTHER

## 2020-10-26 NOTE — PROGRESS NOTES
"Chief Complaint  Chief Complaint   Patient presents with    Abdominal Cramping    Hair Loss    Fatigue       HPI    Anabela Carrizales is a 25 y.o. female that presents for abdominal cramping, hair loss, fatigue.    Patient under the care of OBGYN for abdominal cramping completed a pelvic US with noted constipation at this time. Has since controlled her BM with improvement in her pain. Also complaining of menorrhagia in this time period. Bled consecutively for 4 months. Accompanied by fatigue, weakness, dizziness. Dizzy particularly with sit to stand. Accompanied by thinning of her particular in the front her her hairline. Noted "clumps" of hair with brushing. Increased irritability. Not currently on any OTC multivitamins. Occasionally takes B12. Mentally feeling well, sleeping well 6-8 hours per night. Weight increasing despite regular physical activity. OB checked a CBC and thyroid without noted abnormalities. Hydrates well. Progesterone IUD in place. BM regularly, normal, formed. Taking colace intermittently and added fiber to her diet which has contributed to her regular BM.        PAST MEDICAL HISTORY:  History reviewed. No pertinent past medical history.    PAST SURGICAL HISTORY:  History reviewed. No pertinent surgical history.    SOCIAL HISTORY:  Social History     Socioeconomic History    Marital status: Single     Spouse name: Not on file    Number of children: Not on file    Years of education: Not on file    Highest education level: Not on file   Occupational History    Not on file   Social Needs    Financial resource strain: Not on file    Food insecurity     Worry: Not on file     Inability: Not on file    Transportation needs     Medical: Not on file     Non-medical: Not on file   Tobacco Use    Smoking status: Current Every Day Smoker     Packs/day: 0.00     Types: Vaping with nicotine    Smokeless tobacco: Never Used   Substance and Sexual Activity    Alcohol use: Yes     Comment: 3-4 x " a week    Drug use: No    Sexual activity: Yes     Partners: Male     Birth control/protection: None, I.U.D.   Lifestyle    Physical activity     Days per week: Not on file     Minutes per session: Not on file    Stress: Not on file   Relationships    Social connections     Talks on phone: Not on file     Gets together: Not on file     Attends Zoroastrianism service: Not on file     Active member of club or organization: Not on file     Attends meetings of clubs or organizations: Not on file     Relationship status: Not on file   Other Topics Concern    Not on file   Social History Narrative    Not on file       FAMILY HISTORY:  Family History   Problem Relation Age of Onset    Breast cancer Other     Colon cancer Neg Hx     Ovarian cancer Neg Hx        ALLERGIES AND MEDICATIONS: updated and reviewed.  Review of patient's allergies indicates:   Allergen Reactions    Bactrim [sulfamethoxazole-trimethoprim] Other (See Comments)     Stomach upset     Current Outpatient Medications   Medication Sig Dispense Refill    levonorgestrel (KYLEENA) 17.5 mcg/24 hrs (5 yrs) 19.5 mg IUD 1 Intra Uterine Device (17.5 mcg total) by Intrauterine route once. for 1 dose 1 Intra Uterine Device 0    nicotine (NICODERM CQ) 14 mg/24 hr Place 1 patch onto the skin once daily. 28 patch 2    valACYclovir (VALTREX) 1000 MG tablet TAKE 1 TABLET(1000 MG) BY MOUTH EVERY DAY (Patient not taking: Reported on 10/26/2020) 90 tablet 1     No current facility-administered medications for this visit.          ROS  Review of Systems   Constitutional: Positive for fatigue. Negative for chills and fever.   HENT: Negative for ear pain, postnasal drip and sinus pain.    Respiratory: Negative for cough and shortness of breath.    Cardiovascular: Negative for chest pain.   Gastrointestinal: Positive for abdominal distention and abdominal pain. Negative for diarrhea, nausea and vomiting.   Skin:        Hair loss, thinning             PHYSICAL  "EXAM  Vitals:    10/26/20 0905   BP: 106/72   BP Location: Right arm   Patient Position: Sitting   BP Method: Medium (Manual)   Pulse: 74   Resp: 18   Temp: 97.5 °F (36.4 °C)   TempSrc: Oral   SpO2: 100%   Weight: 79.3 kg (174 lb 13.2 oz)   Height: 5' 5" (1.651 m)    Body mass index is 29.09 kg/m².  Weight: 79.3 kg (174 lb 13.2 oz)   Height: 5' 5" (165.1 cm)     Physical Exam  Constitutional:       Appearance: She is well-developed.   HENT:      Head: Normocephalic.      Right Ear: Tympanic membrane normal.      Left Ear: Tympanic membrane normal.      Mouth/Throat:      Pharynx: Uvula midline.   Eyes:      Conjunctiva/sclera: Conjunctivae normal.   Cardiovascular:      Rate and Rhythm: Normal rate and regular rhythm.      Pulses: Normal pulses.           Radial pulses are 2+ on the right side and 2+ on the left side.      Heart sounds: Normal heart sounds. No murmur.      Comments: No LE swelling noted  Pulmonary:      Effort: Pulmonary effort is normal.      Breath sounds: Normal breath sounds. No wheezing.   Abdominal:      General: Bowel sounds are normal.      Palpations: Abdomen is soft.      Tenderness: There is no abdominal tenderness.   Lymphadenopathy:      Cervical: No cervical adenopathy.   Skin:     General: Skin is warm and dry.      Findings: No rash.   Neurological:      Mental Status: She is alert and oriented to person, place, and time.           Health Maintenance       Date Due Completion Date    Hepatitis C Screening 1995 ---    Lipid Panel 1995 ---    Pneumococcal Vaccine (Medium Risk) (1 of 1 - PPSV23) 04/15/2015 ---    TETANUS VACCINE 03/06/2018 3/6/2008    Chlamydia Screening 09/04/2019 9/4/2018    Influenza Vaccine (1) 08/01/2020 1/26/2010    Pap Smear 04/18/2022 4/18/2019            Assessment & Plan    Problem List Items Addressed This Visit     None      Visit Diagnoses     Fatigue, unspecified type    -  Primary    Relevant Orders    Ferritin    Iron and TIBC    Vitamin B12 "    Folate    Abdominal pain, unspecified abdominal location        Relevant Orders    POCT URINE DIPSTICK WITHOUT MICROSCOPE (Completed)    Routine physical examination        Relevant Orders    Comprehensive Metabolic Panel    Hepatitis C Antibody    Encounter for screening for cardiovascular disorders        Relevant Orders    Lipid Panel          Follow-up: Follow up if symptoms worsen or fail to improve.    Fatimah Kate    Medication List with Changes/Refills   New Medications    NICOTINE (NICODERM CQ) 14 MG/24 HR    Place 1 patch onto the skin once daily.   Current Medications    LEVONORGESTREL (KYLEENA) 17.5 MCG/24 HRS (5 YRS) 19.5 MG IUD    1 Intra Uterine Device (17.5 mcg total) by Intrauterine route once. for 1 dose    VALACYCLOVIR (VALTREX) 1000 MG TABLET    TAKE 1 TABLET(1000 MG) BY MOUTH EVERY DAY   Discontinued Medications    ERYTHROMYCIN (ROMYCIN) OPHTHALMIC OINTMENT    Place a 1/2 inch ribbon of ointment into the lower eyelid.    NORETHINDRONE (ORTHO MICRONOR) 0.35 MG TABLET    Take 1 tablet (0.35 mg total) by mouth once daily.

## 2021-04-14 RX ORDER — VALACYCLOVIR HYDROCHLORIDE 1 G/1
1000 TABLET, FILM COATED ORAL DAILY
Qty: 90 TABLET | Refills: 1 | Status: SHIPPED | OUTPATIENT
Start: 2021-04-14

## 2021-04-14 RX ORDER — VALACYCLOVIR HYDROCHLORIDE 1 G/1
1000 TABLET, FILM COATED ORAL DAILY
Qty: 90 TABLET | Refills: 1 | Status: SHIPPED | OUTPATIENT
Start: 2021-04-14 | End: 2022-01-28

## 2021-08-11 ENCOUNTER — PATIENT OUTREACH (OUTPATIENT)
Dept: ADMINISTRATIVE | Facility: HOSPITAL | Age: 26
End: 2021-08-11

## 2021-11-09 DIAGNOSIS — S66.809A: Primary | ICD-10-CM

## 2021-11-12 ENCOUNTER — TELEPHONE (OUTPATIENT)
Dept: PRIMARY CARE CLINIC | Facility: CLINIC | Age: 26
End: 2021-11-12
Payer: MEDICAID

## 2021-11-16 ENCOUNTER — CLINICAL SUPPORT (OUTPATIENT)
Dept: REHABILITATION | Facility: HOSPITAL | Age: 26
End: 2021-11-16
Payer: MEDICAID

## 2021-11-16 DIAGNOSIS — M25.641 DECREASED RANGE OF MOTION OF FINGER OF RIGHT HAND: ICD-10-CM

## 2021-11-16 PROBLEM — M25.649 DECREASED RANGE OF MOTION OF FINGER: Status: ACTIVE | Noted: 2021-11-16

## 2021-11-16 PROCEDURE — 97165 OT EVAL LOW COMPLEX 30 MIN: CPT | Mod: PO

## 2021-11-16 PROCEDURE — 97530 THERAPEUTIC ACTIVITIES: CPT | Mod: PO

## 2021-12-28 ENCOUNTER — CLINICAL SUPPORT (OUTPATIENT)
Dept: REHABILITATION | Facility: HOSPITAL | Age: 26
End: 2021-12-28
Payer: MEDICAID

## 2021-12-28 DIAGNOSIS — M25.641 DECREASED RANGE OF MOTION OF FINGER OF RIGHT HAND: Primary | ICD-10-CM

## 2021-12-28 PROCEDURE — 97530 THERAPEUTIC ACTIVITIES: CPT | Mod: PO

## 2021-12-28 PROCEDURE — 97166 OT EVAL MOD COMPLEX 45 MIN: CPT | Mod: PO

## 2022-01-20 ENCOUNTER — TELEPHONE (OUTPATIENT)
Dept: REHABILITATION | Facility: HOSPITAL | Age: 27
End: 2022-01-20
Payer: MEDICAID

## 2022-01-20 NOTE — TELEPHONE ENCOUNTER
Spoke to pt re: missed appointment today and confirmed next appointment on Thursday, 1/20/22, at 11:15 am.

## 2022-01-27 ENCOUNTER — PATIENT OUTREACH (OUTPATIENT)
Dept: ADMINISTRATIVE | Facility: HOSPITAL | Age: 27
End: 2022-01-27
Payer: MEDICAID

## 2022-01-28 ENCOUNTER — OFFICE VISIT (OUTPATIENT)
Dept: PRIMARY CARE CLINIC | Facility: CLINIC | Age: 27
End: 2022-01-28
Payer: MEDICAID

## 2022-01-28 VITALS
OXYGEN SATURATION: 98 % | RESPIRATION RATE: 16 BRPM | HEART RATE: 75 BPM | WEIGHT: 166.44 LBS | SYSTOLIC BLOOD PRESSURE: 124 MMHG | HEIGHT: 65 IN | BODY MASS INDEX: 27.73 KG/M2 | DIASTOLIC BLOOD PRESSURE: 76 MMHG

## 2022-01-28 DIAGNOSIS — F41.9 ANXIETY: Primary | ICD-10-CM

## 2022-01-28 PROCEDURE — 99999 PR PBB SHADOW E&M-EST. PATIENT-LVL III: ICD-10-PCS | Mod: PBBFAC,,, | Performed by: NURSE PRACTITIONER

## 2022-01-28 PROCEDURE — 99213 OFFICE O/P EST LOW 20 MIN: CPT | Mod: PBBFAC,PN | Performed by: NURSE PRACTITIONER

## 2022-01-28 PROCEDURE — 99214 OFFICE O/P EST MOD 30 MIN: CPT | Mod: S$PBB,,, | Performed by: NURSE PRACTITIONER

## 2022-01-28 PROCEDURE — 3074F SYST BP LT 130 MM HG: CPT | Mod: CPTII,,, | Performed by: NURSE PRACTITIONER

## 2022-01-28 PROCEDURE — 1159F MED LIST DOCD IN RCRD: CPT | Mod: CPTII,,, | Performed by: NURSE PRACTITIONER

## 2022-01-28 PROCEDURE — 1159F PR MEDICATION LIST DOCUMENTED IN MEDICAL RECORD: ICD-10-PCS | Mod: CPTII,,, | Performed by: NURSE PRACTITIONER

## 2022-01-28 PROCEDURE — 3074F PR MOST RECENT SYSTOLIC BLOOD PRESSURE < 130 MM HG: ICD-10-PCS | Mod: CPTII,,, | Performed by: NURSE PRACTITIONER

## 2022-01-28 PROCEDURE — 99999 PR PBB SHADOW E&M-EST. PATIENT-LVL III: CPT | Mod: PBBFAC,,, | Performed by: NURSE PRACTITIONER

## 2022-01-28 PROCEDURE — 3008F PR BODY MASS INDEX (BMI) DOCUMENTED: ICD-10-PCS | Mod: CPTII,,, | Performed by: NURSE PRACTITIONER

## 2022-01-28 PROCEDURE — 3078F PR MOST RECENT DIASTOLIC BLOOD PRESSURE < 80 MM HG: ICD-10-PCS | Mod: CPTII,,, | Performed by: NURSE PRACTITIONER

## 2022-01-28 PROCEDURE — 3078F DIAST BP <80 MM HG: CPT | Mod: CPTII,,, | Performed by: NURSE PRACTITIONER

## 2022-01-28 PROCEDURE — 3008F BODY MASS INDEX DOCD: CPT | Mod: CPTII,,, | Performed by: NURSE PRACTITIONER

## 2022-01-28 PROCEDURE — 99214 PR OFFICE/OUTPT VISIT, EST, LEVL IV, 30-39 MIN: ICD-10-PCS | Mod: S$PBB,,, | Performed by: NURSE PRACTITIONER

## 2022-01-28 RX ORDER — TRAZODONE HYDROCHLORIDE 50 MG/1
50 TABLET ORAL NIGHTLY
Qty: 30 TABLET | Refills: 11 | Status: SHIPPED | OUTPATIENT
Start: 2022-01-28 | End: 2023-01-28

## 2022-01-28 RX ORDER — BUPROPION HYDROCHLORIDE 150 MG/1
150 TABLET ORAL DAILY
Qty: 30 TABLET | Refills: 11 | Status: SHIPPED | OUTPATIENT
Start: 2022-01-28 | End: 2023-01-28

## 2022-01-28 NOTE — PROGRESS NOTES
"Chief Complaint  Chief Complaint   Patient presents with    Depression    Anxiety    Anorexia    Nausea    throat pain     Left side of neck    Cough     Lungs hurt with cough       HPI    Anabela Carrizales is a 26 y.o. female that presents for anxiety and depression.    Patient with a h/o anxiety in the past was prescribed an SSRI in the past. Did not "react well" to the SSRI. For the last year she has become more numb and sad. Not capable of feeling positive emotions. Easily overwhelmed and nervous. Increased anxiety and difficulty sleeping. Decreased appetite over the past few months. Has lost almost 20 lbs in the past 2 months. Starting to eat but went three weeks without a real meal. Wasn't hungry and intermittent nausea when she eats. No panic attacks. Does have a h/o insomnia since a teenager. Was treated with trazodone but stopped when she stopped her SSRI. Has been taking OTC melatonin, zquil, benadryl, generic sleep aid pills. Does help but she builds a tolerance to it quickly. Has been working hard at improving her health and journaling, setting goals for a positive mind-set. Interested in seeing a therapist. Good support system with friends. Panic attack last week. Does not exercise. No excessive caffeine intake.     Patient Health Questionnaire Depression Scale (PHQ-9):    Over the last 2 weeks, how often have you been bothered by any of the following problems?  (Not at all: 0; several days: 1, more than half the days: 2, nearly every day: 3)    1. Little interest or pleasure in doing things: 3  2. Feeling down, depressed, or hopeless: 1  3. Trouble falling or staying asleep, or sleeping too much: 3  4. Feeling tired or having little energy: 3  5. Poor appetite or overeating: 3  6. Feeling bad about yourself, or that you are a failure, or have let yourself or your family down: 1  7. Trouble concentrating on things, such as reading the newspaper or watching television: 3  8. Moving or speaking so " slowly that other people could have noticed. Or the opposite- being so fidgety or restless that you have been moving       around a lot more than usual: 2  9. Thoughts that you would be better off dead, or of hurting yourself in some way: 0    Total Score: 18    (Score >15 - major depression; Score >20 - severe major depression)          PAST MEDICAL HISTORY:  Past Medical History:   Diagnosis Date    Respiratory distress        PAST SURGICAL HISTORY:  Past Surgical History:   Procedure Laterality Date    HAND TENDON SURGERY Right        SOCIAL HISTORY:  Social History     Socioeconomic History    Marital status: Single   Tobacco Use    Smoking status: Current Every Day Smoker     Packs/day: 0.00     Types: Vaping with nicotine    Smokeless tobacco: Never Used   Substance and Sexual Activity    Alcohol use: Yes     Comment: rare    Drug use: No    Sexual activity: Yes     Partners: Male     Birth control/protection: None, I.U.D.       FAMILY HISTORY:  Family History   Problem Relation Age of Onset    Breast cancer Other     Colon cancer Neg Hx     Ovarian cancer Neg Hx        ALLERGIES AND MEDICATIONS: updated and reviewed.  Review of patient's allergies indicates:   Allergen Reactions    Bactrim [sulfamethoxazole-trimethoprim] Other (See Comments)     Stomach upset     Current Outpatient Medications   Medication Sig Dispense Refill    levonorgestrel (KYLEENA) 17.5 mcg/24 hrs (5 yrs) 19.5 mg IUD 1 Intra Uterine Device (17.5 mcg total) by Intrauterine route once. for 1 dose 1 Intra Uterine Device 0    ondansetron (ZOFRAN) 4 MG tablet Take 1 tablet (4 mg total) by mouth every 6 (six) hours. 12 tablet 0    valACYclovir (VALTREX) 1000 MG tablet Take 1 tablet (1,000 mg total) by mouth once daily. 90 tablet 1    buPROPion (WELLBUTRIN XL) 150 MG TB24 tablet Take 1 tablet (150 mg total) by mouth once daily. 30 tablet 11    traZODone (DESYREL) 50 MG tablet Take 1 tablet (50 mg total) by mouth every evening.  "30 tablet 11     No current facility-administered medications for this visit.         ROS  Review of Systems   Constitutional: Negative for chills and fever.   HENT: Negative for ear pain, postnasal drip and sinus pain.    Respiratory: Negative for cough and shortness of breath.    Cardiovascular: Negative for chest pain.   Gastrointestinal: Negative for diarrhea, nausea and vomiting.   Psychiatric/Behavioral: Positive for decreased concentration and sleep disturbance. Negative for suicidal ideas. The patient is nervous/anxious.            PHYSICAL EXAM  Vitals:    01/28/22 1316   BP: 124/76   BP Location: Right arm   Patient Position: Sitting   BP Method: Medium (Manual)   Pulse: 75   Resp: 16   SpO2: 98%   Weight: 75.5 kg (166 lb 7.2 oz)   Height: 5' 5" (1.651 m)    Body mass index is 27.7 kg/m².  Weight: 75.5 kg (166 lb 7.2 oz)   Height: 5' 5" (165.1 cm)     Physical Exam  Constitutional:       Appearance: She is well-developed and well-nourished.   HENT:      Head: Normocephalic.      Right Ear: Tympanic membrane normal.      Left Ear: Tympanic membrane normal.      Mouth/Throat:      Mouth: Oropharynx is clear and moist and mucous membranes are normal.      Pharynx: Uvula midline.   Eyes:      Conjunctiva/sclera: Conjunctivae normal.   Cardiovascular:      Rate and Rhythm: Normal rate and regular rhythm.      Pulses: Normal pulses.           Radial pulses are 2+ on the right side and 2+ on the left side.      Heart sounds: Normal heart sounds. No murmur heard.       Comments: No LE swelling noted  Pulmonary:      Effort: Pulmonary effort is normal.      Breath sounds: Normal breath sounds. No wheezing.   Abdominal:      General: Bowel sounds are normal.      Palpations: Abdomen is soft.      Tenderness: There is no abdominal tenderness.   Musculoskeletal:         General: No edema.   Lymphadenopathy:      Cervical: No cervical adenopathy.   Skin:     General: Skin is warm and dry.      Findings: No rash. "   Neurological:      Mental Status: She is alert and oriented to person, place, and time.   Psychiatric:         Mood and Affect: Mood and affect normal.           Health Maintenance       Date Due Completion Date    COVID-19 Vaccine (1) Never done ---    Lipid Panel 10/26/2021 10/26/2020    Influenza Vaccine (1) 06/30/2022 (Originally 9/1/2021) 1/26/2010    Pneumococcal Vaccines (Age 0-64) (1 of 2 - PPSV23) 01/28/2023 (Originally 4/15/2002) ---    Pap Smear 04/18/2022 4/18/2019    TETANUS VACCINE 10/29/2031 10/29/2021            Assessment & Plan    Problem List Items Addressed This Visit    None     Visit Diagnoses     Anxiety    -  Primary    Relevant Medications    buPROPion (WELLBUTRIN XL) 150 MG TB24 tablet    traZODone (DESYREL) 50 MG tablet    Other Relevant Orders    Ambulatory referral/consult to Psychiatry          Follow-up: No follow-ups on file.    Fatimah Kate    Medication List with Changes/Refills   New Medications    BUPROPION (WELLBUTRIN XL) 150 MG TB24 TABLET    Take 1 tablet (150 mg total) by mouth once daily.    TRAZODONE (DESYREL) 50 MG TABLET    Take 1 tablet (50 mg total) by mouth every evening.   Current Medications    LEVONORGESTREL (KYLEENA) 17.5 MCG/24 HRS (5 YRS) 19.5 MG IUD    1 Intra Uterine Device (17.5 mcg total) by Intrauterine route once. for 1 dose    ONDANSETRON (ZOFRAN) 4 MG TABLET    Take 1 tablet (4 mg total) by mouth every 6 (six) hours.    VALACYCLOVIR (VALTREX) 1000 MG TABLET    Take 1 tablet (1,000 mg total) by mouth once daily.   Discontinued Medications    MUPIROCIN (BACTROBAN) 2 % OINTMENT    Apply topically 3 (three) times daily.    NICOTINE (NICODERM CQ) 14 MG/24 HR    Place 1 patch onto the skin once daily.    VALACYCLOVIR (VALTREX) 1000 MG TABLET    Take 1 tablet (1,000 mg total) by mouth once daily.

## 2022-02-01 ENCOUNTER — TELEPHONE (OUTPATIENT)
Dept: PSYCHOLOGY | Facility: CLINIC | Age: 27
End: 2022-02-01
Payer: MEDICAID

## 2022-02-01 NOTE — TELEPHONE ENCOUNTER
----- Message from Laurie Murillo LCSW sent at 2/1/2022 11:01 AM CST -----    ----- Message -----  From: Jennifer Grove  Sent: 2/1/2022  10:47 AM CST  To: Laurie Murillo LCSW    Anxiety [F41.9] referral in please call patient to schedule

## 2023-03-31 ENCOUNTER — PATIENT MESSAGE (OUTPATIENT)
Dept: PRIMARY CARE CLINIC | Facility: CLINIC | Age: 28
End: 2023-03-31
Payer: MEDICAID

## 2023-09-18 ENCOUNTER — PATIENT MESSAGE (OUTPATIENT)
Dept: PRIMARY CARE CLINIC | Facility: CLINIC | Age: 28
End: 2023-09-18
Payer: MEDICAID

## 2023-10-18 ENCOUNTER — PATIENT MESSAGE (OUTPATIENT)
Dept: CARDIOLOGY | Facility: CLINIC | Age: 28
End: 2023-10-18
Payer: MEDICAID

## 2023-12-26 ENCOUNTER — TELEPHONE (OUTPATIENT)
Dept: OBSTETRICS AND GYNECOLOGY | Facility: CLINIC | Age: 28
End: 2023-12-26

## 2024-11-07 PROBLEM — M79.601 RIGHT ARM PAIN: Status: ACTIVE | Noted: 2024-11-07

## 2024-11-07 PROBLEM — T14.90XA TRAUMA: Status: ACTIVE | Noted: 2024-11-07

## 2025-03-19 ENCOUNTER — TELEPHONE (OUTPATIENT)
Dept: ORTHOPEDICS | Facility: CLINIC | Age: 30
End: 2025-03-19
Payer: MEDICAID

## 2025-03-20 ENCOUNTER — OFFICE VISIT (OUTPATIENT)
Dept: ORTHOPEDICS | Facility: CLINIC | Age: 30
End: 2025-03-20
Payer: MEDICAID

## 2025-03-20 VITALS
SYSTOLIC BLOOD PRESSURE: 125 MMHG | DIASTOLIC BLOOD PRESSURE: 87 MMHG | HEART RATE: 88 BPM | BODY MASS INDEX: 31.35 KG/M2 | WEIGHT: 188.38 LBS

## 2025-03-20 DIAGNOSIS — M24.421: ICD-10-CM

## 2025-03-20 DIAGNOSIS — M25.50 CHRONIC JOINT PAIN: ICD-10-CM

## 2025-03-20 DIAGNOSIS — G89.29 CHRONIC JOINT PAIN: ICD-10-CM

## 2025-03-20 DIAGNOSIS — Q79.60 EHLERS-DANLOS SYNDROME: Primary | ICD-10-CM

## 2025-03-20 PROCEDURE — 99999 PR PBB SHADOW E&M-EST. PATIENT-LVL IV: CPT | Mod: PBBFAC,,,

## 2025-03-20 PROCEDURE — 99204 OFFICE O/P NEW MOD 45 MIN: CPT | Mod: S$PBB,,,

## 2025-03-20 PROCEDURE — 3074F SYST BP LT 130 MM HG: CPT | Mod: CPTII,,,

## 2025-03-20 PROCEDURE — 3008F BODY MASS INDEX DOCD: CPT | Mod: CPTII,,,

## 2025-03-20 PROCEDURE — 1160F RVW MEDS BY RX/DR IN RCRD: CPT | Mod: CPTII,,,

## 2025-03-20 PROCEDURE — 3079F DIAST BP 80-89 MM HG: CPT | Mod: CPTII,,,

## 2025-03-20 PROCEDURE — 1159F MED LIST DOCD IN RCRD: CPT | Mod: CPTII,,,

## 2025-03-20 PROCEDURE — 99214 OFFICE O/P EST MOD 30 MIN: CPT | Mod: PBBFAC,PN

## 2025-03-20 NOTE — PROGRESS NOTES
"Patient ID: Anabela Carrizales is a 29 y.o. female    Pain of the Right Hip, Pain of the Right Hand, Pain of the Left Hand, and Pain of the Right Upper Arm      History of Present Illness:    Anabela Carrizales presents to clinic for  evaluation of recurrent dislocations of right shoulder, bilateral thumbs, right hip, and right elbow  and chronic joint pain. Per patient, right elbow dislocations occur more frequently than other joints. States that joint dislocations occur with daily activities such as cooking and lifting. The pain started an undisclosed number of years ago and is becoming progressively worse. Patient reports frequent dislocations and joint pain "began as far back as I can remember when I was a child." She did not receive a work up for this issue as a child. Pain is located over (points to) bilateral knees, right shoulder, right hip, bilateral wrists, bilateral thumbs, and right elbow. She reports that the pain is a 5 /10 non-radiating, aching, and intermittent. No relief with OTC NSAIDs. The pain is affecting ADLs and limiting desired level of activity. Reports last dislocation of right shoulder occurred on 11/5/2024 and she "popped it back in place at home." ED visit on 11/7/2024 due to continued pain shoulder pain post reduction. Prescribed prednisone, motrin, and robaxin without relief. Denies numbness, tingling, radiation and inability to bear weight. Endorses occasional weakness to bilateral hands due to "thumbs locking up or popping out of place so I drop things."  Pain is 10 /10 at its worst.     Trial of anti-inflammatories with no improvement.   Patient reports prednisone, motrin, and robaxin prescribed by ED provider on 11/7/2024 with no improvement.    Occupation: homemaker    Ambulating: without assistance  Diabetic: n/a  Smoking: Daily  Hx of DVT/PE: n/a     PAST MEDICAL HISTORY:   Past Medical History:   Diagnosis Date    Respiratory distress      PAST SURGICAL HISTORY:   Past Surgical " History:   Procedure Laterality Date    HAND TENDON SURGERY Right      FAMILY HISTORY:   Family History   Problem Relation Name Age of Onset    Breast cancer Other maternal great aunt     Colon cancer Neg Hx      Ovarian cancer Neg Hx       SOCIAL HISTORY:   Social History     Occupational History    Not on file   Tobacco Use    Smoking status: Every Day     Types: Vaping with nicotine, Cigarettes    Smokeless tobacco: Never   Substance and Sexual Activity    Alcohol use: Yes     Comment: rare    Drug use: No    Sexual activity: Yes     Partners: Male     Birth control/protection: None, I.U.D.        MEDICATIONS: Current Medications[1]  ALLERGIES:   Review of patient's allergies indicates:   Allergen Reactions    Bactrim [sulfamethoxazole-trimethoprim] Other (See Comments)     Stomach upset         Physical Exam     Vitals:    03/20/25 0919   BP: 125/87   Pulse: 88     Alert and oriented to person, place and time. No acute distress. Well-groomed, not ill appearing. Pupils round and reactive, normal respiratory effort, no audible wheezing.     Beighton score 7/9  Hypermobility of bilateral elbows, right shoulder, bilateral thumbs, and bilateral wrists noted.    Shoulder / Upper Extremity Exam    OBSERVATION:     Swelling  none  Deformity  none   Discoloration  none   Scapular winging none   Scars   none  Atrophy  none    TENDERNESS                Clavicle   Negative         AC Jt.    Negative        SC Jt.    Negative          Acromion:  Negative        Scapular Spine Negative   Supraspinatus  Negative       Infraspinatus  Negative   LH Biceps   Negative   Greater Tub.  Negative   Trapezius  Negative   Cervical spine  Negative        ROM: (* = with pain)               FE    190° (Hypermobility noted)     ER at 0°    60°        ER at 90° ABD  90°       IR at 90°  ABD   NA         IR (spine level)   T4         STRENGTH: (* = with  pain)    SCAPTION   5/5        IR    5/5       ER    5/5       BICEPS   5/5       Deltoid    5/5         SIGNS:  Painful side       NEER   neg    OPAULAS  neg    KENNEDY   neg    SPEEDS  neg     DROP ARM   neg   BELLY PRESS neg   Superior escape none    LIFT-OFF  neg   X-Body ADD    neg    MOVING VALGUS neg        STABILITY TESTING        Translation       Anterior  Normal      Posterior  Normal     Sulcus   < 10mm     Signs    Apprehension   neg   Relocation   no change        Jerk test  neg         Imagin view right humerus and 2 view right forearm X-rays reviewed by me showing no evidence of fracture or dislocation. There is no obvious malalignment. No evidence of masses, lesions or foreign bodies.       Assessment & Plan    Morgan-Danlos syndrome  -     Ambulatory referral/consult to Orthopedics  -     Ambulatory referral/consult to Rheumatology; Future; Expected date: 2025  -     CBC auto differential; Future; Expected date: 2025  -     Sedimentation rate; Future; Expected date: 2025  -     C-reactive protein; Future; Expected date: 2025  -     JOSUE; Future; Expected date: 2025  -     Cyclic citrul peptide antibody, IgG; Future; Expected date: 2025  -     Rheumatoid factor; Future; Expected date: 2025  -     HLA B27 Antigen; Future; Expected date: 2025    Chronic joint pain  -     Ambulatory referral/consult to Orthopedics  -     Ambulatory referral/consult to Rheumatology; Future; Expected date: 2025  -     CBC auto differential; Future; Expected date: 2025  -     Sedimentation rate; Future; Expected date: 2025  -     C-reactive protein; Future; Expected date: 2025  -     JOSUE; Future; Expected date: 2025  -     Cyclic citrul peptide antibody, IgG; Future; Expected date: 2025  -     Rheumatoid factor; Future; Expected date: 2025  -     HLA B27 Antigen; Future; Expected date: 2025    Recurrent anterior  dislocation of right elbow  -     MRI Elbow Joint Without Contrast Right; Future; Expected date: 03/20/2025         I made the decision to obtain old records of the patient including previous notes and imaging. New imaging was ordered today of the extremity or extremities evaluated. I independently reviewed and interpreted the radiographs and/or MRIs/CT scan today as well as prior imaging.    We discussed at length different treatment options including conservative vs surgical management. These include anti-inflammatories, acetaminophen, rest, ice, heat, formal physical therapy including strengthening and stretching exercises, home exercise programs, injections, dry needling, and finally surgical intervention.      Patient presents to clinic for evaluation of recurrent dislocations and chronic multi-joint pain. High clinical suspicion for Morgan-Danlos Syndrome. Beighton score 7/9. Referral placed to rheumatology with order faxed to [facility] for patient scheduling and follow up. CBC, Sedimentation rate, CRP, JOSUE, RF, HLA B27, and cyclic citrul peptide antibody to be completed by patient today with MRI of right elbow ordered due to recurrent dislocations. Patient agreeable with plan of care.    Follow up: Patient instructed to schedule follow up with [facility] rheumatology for further evaluation. RTC PRN worsening pain.  X-rays next visit: none    All questions were answered and patient is agreeable to the above plan.                    [1]   Current Outpatient Medications:     azithromycin (Z-ROGELIO) 250 MG tablet, Take 1 tablet (250 mg total) by mouth once daily. Take first 2 tablets together, then 1 every day until finished., Disp: 6 tablet, Rfl: 0    buPROPion (WELLBUTRIN XL) 150 MG TB24 tablet, Take 1 tablet (150 mg total) by mouth once daily., Disp: 30 tablet, Rfl: 11    ibuprofen (ADVIL,MOTRIN) 800 MG tablet, Take 1 tablet (800 mg total) by mouth every 6 (six) hours as needed for Pain., Disp: 20 tablet, Rfl: 0     levonorgestrel (KYLEENA) 17.5 mcg/24 hrs (5 yrs) 19.5 mg IUD, 1 Intra Uterine Device (17.5 mcg total) by Intrauterine route once. for 1 dose, Disp: 1 Intra Uterine Device, Rfl: 0    ondansetron (ZOFRAN) 4 MG tablet, Take 1 tablet (4 mg total) by mouth every 6 (six) hours., Disp: 12 tablet, Rfl: 0    traZODone (DESYREL) 50 MG tablet, Take 1 tablet (50 mg total) by mouth every evening., Disp: 30 tablet, Rfl: 11    valACYclovir (VALTREX) 1000 MG tablet, Take 1 tablet (1,000 mg total) by mouth once daily., Disp: 90 tablet, Rfl: 1

## 2025-03-28 ENCOUNTER — PATIENT MESSAGE (OUTPATIENT)
Dept: ORTHOPEDICS | Facility: CLINIC | Age: 30
End: 2025-03-28
Payer: MEDICAID

## 2025-04-09 ENCOUNTER — OFFICE VISIT (OUTPATIENT)
Dept: ORTHOPEDICS | Facility: CLINIC | Age: 30
End: 2025-04-09
Payer: MEDICAID

## 2025-04-09 ENCOUNTER — PATIENT MESSAGE (OUTPATIENT)
Dept: ORTHOPEDICS | Facility: CLINIC | Age: 30
End: 2025-04-09

## 2025-04-09 DIAGNOSIS — M22.2X2 PATELLOFEMORAL ARTHRALGIA OF BOTH KNEES: Primary | ICD-10-CM

## 2025-04-09 DIAGNOSIS — M22.2X1 PATELLOFEMORAL ARTHRALGIA OF BOTH KNEES: Primary | ICD-10-CM

## 2025-04-09 NOTE — PROGRESS NOTES
H&P  Orthopaedics      Telemedicine visit:  The patient location is: home  The chief complaint leading to consultation is: MRI elbow results  Visit type: Virtual visit with synchronous audio and video  Total time spent with patient: start: 11:00 end:11:10  Each patient to whom he or she provides medical services by telemedicine is:  (1) informed of the relationship between the physician and patient and the respective role of any other health care provider with respect to management of the patient; and (2) notified that he or she may decline to receive medical services by telemedicine and may withdraw from such care at any time.    HPI:    Patient has virtual to discuss MRI results of her elbow.  Previously was evaluated in clinic for chronic joint pain.  Her autoimmune panel was positive.  She has an appointment with [facility] in June.  She is now describing other symptoms related to her joint pain including skin burning and some shortness of breath when eating.  Regarding her knee pain, she has tried bracing which has improved the popping sensation.  She has not done formal physical therapy.  She does endorse numbness and tingling which is intermittent and not in a consistent nerve distribution.  States one day it will be her right arm than the next and maybe her left leg.    Imaging:  MRI right elbow: 1. 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 Correlation for ulnar neuritis recommended.       A/P:    Discussed with patient her MRI is relatively unremarkable.  She did have some ulnar neuritis however not having consistent symptoms.  I do not think an EMG is warranted at this time.  Definitely encouraged follow up with Rheumatology.  They will be able to provide more insight and likely order more tests regarding her condition.    With her knee pain, we will send her to physical therapy for patellofemoral syndrome since she is getting improvement with  bracing.  Could be candidate for MPFL reconstruction if patient is interested.

## 2025-05-23 ENCOUNTER — CLINICAL SUPPORT (OUTPATIENT)
Dept: REHABILITATION | Facility: OTHER | Age: 30
End: 2025-05-23
Payer: MEDICAID

## 2025-05-23 DIAGNOSIS — M22.2X2 PATELLOFEMORAL ARTHRALGIA OF BOTH KNEES: ICD-10-CM

## 2025-05-23 DIAGNOSIS — R26.2 DIFFICULTY IN WALKING: Primary | ICD-10-CM

## 2025-05-23 DIAGNOSIS — M22.2X1 PATELLOFEMORAL ARTHRALGIA OF BOTH KNEES: ICD-10-CM

## 2025-05-23 DIAGNOSIS — R29.898 DECREASED STRENGTH OF LOWER EXTREMITY: ICD-10-CM

## 2025-05-23 PROCEDURE — 97161 PT EVAL LOW COMPLEX 20 MIN: CPT

## 2025-05-23 NOTE — PROGRESS NOTES
Outpatient Rehab    Physical Therapy Evaluation    Patient Name: Anabela Carrizales  MRN: [id number]  YOB: 1995  Encounter Date: 5/23/2025    Therapy Diagnosis:   Encounter Diagnoses   Name Primary?    Patellofemoral arthralgia of both knees     Difficulty in walking Yes    Decreased strength of lower extremity      Physician: Farida Guzman, *    Physician Orders: Eval and Treat  Medical Diagnosis: Patellofemoral arthralgia of both knees    Visit # / Visits Authorized:  1 / 1  Insurance Authorization Period: 4/9/2025 to 4/9/2026  Date of Evaluation: 5/23/2025  Plan of Care Certification: 5/23/2025 to 8/1/25   Precautions: EDS- multiple dislocations in multiple joints    Time In: 1025 (patient late)  Time Out: 1100  Total Time (in minutes): 35   Total Billable Time (in minutes): 35    Intake Outcome Measure for FOTO Survey    Therapist reviewed FOTO scores for Anabela Carrizales on 5/23/2025.   FOTO report - see Media section or FOTO account episode details.     Intake Score: 42 %    Subjective   History of Present Illness  Anabela is a 30 y.o. female who reports to physical therapy with a chief concern of B knee pain.         Diagnostic tests related to this condition: None.        History of Present Condition/Illness: Patient reports that her orthopedic thinks that she has EDS and wants her to work on some strengthening- she has an appt with rheumatology in the next 1-2 months. She reports a lot of instability to both knees. She does wear compression sleeves several times a week when she feels like her knees are more unstable. Patient reports that she does have some falls when her knees brook. Her L knee seems to bother her more than the R side. She has not done any strengthening exercises yet. Has to hold on while doing stairs due to feelings of instability and this has been going on since October 2024. She also reports her R elbow keeps dislocating and her R thumb dislocates pretty often  as well.     Pain     Patient reports a current pain level of 3/10. Pain at best is reported as 2/10. Pain at worst is reported as 6/10.   Location: B knee pain- L worse than R side  Clinical Progression (since onset): Stable  Pain Qualities: Aching, Discomfort, Throbbing, Tenderness, Tightness  Pain-Relieving Factors: Activity modification, Change in position, Compression, Immobilization, Lying down, Rest, Medications - over-the-counter  Pain-Aggravating Factors: Exercise, Sitting, Squatting, Standing, Transfers, Walking, Twisting, Stair climbing           Past Medical History/Physical Systems Review:   Anabela Carrizales  has a past medical history of Respiratory distress.    Anabela Carrizales  has a past surgical history that includes Hand tendon surgery (Right).    Anabela has a current medication list which includes the following prescription(s): azithromycin, bupropion, ibuprofen, levonorgestrel, ondansetron, trazodone, and valacyclovir.    Review of patient's allergies indicates:   Allergen Reactions    Bactrim [sulfamethoxazole-trimethoprim] Other (See Comments)     Stomach upset        Objective          GAIT DEVIATIONS: Anabela displays decreased step length;decreased weight shift R side; hesitant and guarded gait with turning    Range of Motion:   Knee Left active Left Passive   Flexion WNL WNL   Extension WNL -5     Knee Right active Right Passive   Flexion WNL WNL   Extension WNL -5       Lower Extremity Strength   Right LE  Left LE    Knee extension: 4/5 Knee extension: 4/5   Knee flexion: 4-/5 Knee flexion: 4-/5   Hip flexion: 4-/5 Hip flexion: 4-/5   Hip extension:  3+/5 Hip extension: 3+/5   Hip abduction: 3+/5 Hip abduction: 3+/5   Hip adduction: 3+/5 Hip adduction 3+/5   Ankle dorsiflexion: 4-/5 Ankle dorsiflexion: 4-/5   Ankle plantarflexion: 4-/5 Ankle plantarflexion: 4-/5     Special Tests:   Right Left   Valgus Stress Test Negative, but hypermobile Negative, but hypermobile   Varus Stress  test Negative, but hypermobile Negative, but hypermobile   Lachman's test Negative Negative   Posterior Drawer Negative Negative   Anterior Drawer Negative Negative   Lanny's Test Negative Negative   Thessaly's Test Negative Negative   Patellar Grind Test Positive Positive     Sit<>stand: decreased weight shift to R side and performed slowly due to fear of symptoms  Single leg balance R: 4 seconds- increased R hip ADD/IR   Single leg balance L: 2 seconds- increased L hip ADD/IR    Joint Mobility: hyppermobile      Palpation: TTP to B lateral quads    Flexibility: hyperflexible       Treatment:  Therapeutic Exercise  TE 1: HEP review: sidelying clams, bridge, SLR, resisted LAQ G TB, SL balance  TE 2: Next visit: LE bike, supine hip ADD, SL balance, TKE, double leg press, quad sets, possible NMES, heel raises, SAQ- weight as tolerated      Time Entry(in minutes):       Assessment & Plan   Assessment  [place] presents with a condition of Low complexity.   Presentation of Symptoms: Stable  Will Comorbidities Impact Care: Yes  Possible EDS    Functional Limitations: Activity tolerance, Ambulating on uneven surfaces, Bed mobility, Community integration, Completing self-care activities, Completing work/school activities, Decreased ambulation distance/endurance, Functional mobility, Gait limitations, Maintaining balance, Pain with ADLs/IADLs, Painful locomotion/ambulation, Participating in leisure activities, Participating in sports, Performing household chores, Sitting tolerance, Squatting, Transfers, Standing tolerance  Impairments: Abnormal gait, Abnormal muscle tone, Activity intolerance, Impaired balance, Pain with functional activity, Lack of appropriate home exercise program, Impaired physical strength, Weight-bearing intolerance  Personal Factors Affecting Prognosis: Physical limitations    Patient Goal for Therapy (PT): Improve strength and decrease risk of dislocations  Prognosis: Good  Assessment Details:  Patient presents to skilled therapy for evaluation of B knee pain/adverse symptoms. Patient's symptoms are consistent with joint hypermobility and hip/LE weakness with increased risks for instability moments and falls/buckling.    Plan  From a physical therapy perspective, the patient would benefit from: Skilled Rehab Services    Planned therapy interventions include: Therapeutic exercise, Neuromuscular re-education, Therapeutic activities, Manual therapy, and Gait training.    Planned modalities to include: Electrical stimulation - attended, Cryotherapy (cold pack), Electrical stimulation - passive/unattended, Thermotherapy (hot pack), and Other (Comment). Dry Needling      Visit Frequency: 2 times Per Week for 10 Weeks.       This plan was discussed with Patient.   Discussion participants: Agreed Upon Plan of Care             The patient's spiritual, cultural, and educational needs were considered, and the patient is agreeable to the plan of care and goals.           Goals:   Active       LTG       Patient will improve B hip/LE strength by 1 grade or more.       Start:  05/23/25    Expected End:  08/01/25            Patient will be able to perform 1 flight of stairs in alternating pattern with increased confidence and minimal increase in adverse symptoms.       Start:  05/23/25    Expected End:  08/01/25               STG       Patient will improve B hip/LE strength by 1/2 grade.       Start:  05/23/25    Expected End:  06/27/25                Lowell Del Cid PT

## 2025-06-03 ENCOUNTER — CLINICAL SUPPORT (OUTPATIENT)
Dept: REHABILITATION | Facility: OTHER | Age: 30
End: 2025-06-03
Payer: MEDICAID

## 2025-06-03 DIAGNOSIS — R26.2 DIFFICULTY IN WALKING: Primary | ICD-10-CM

## 2025-06-03 DIAGNOSIS — R29.898 DECREASED STRENGTH OF LOWER EXTREMITY: ICD-10-CM

## 2025-06-03 PROCEDURE — 97110 THERAPEUTIC EXERCISES: CPT | Mod: CQ

## 2025-06-10 ENCOUNTER — CLINICAL SUPPORT (OUTPATIENT)
Dept: REHABILITATION | Facility: OTHER | Age: 30
End: 2025-06-10
Payer: MEDICAID

## 2025-06-10 DIAGNOSIS — R29.898 DECREASED STRENGTH OF LOWER EXTREMITY: ICD-10-CM

## 2025-06-10 DIAGNOSIS — R26.2 DIFFICULTY IN WALKING: Primary | ICD-10-CM

## 2025-06-10 PROCEDURE — 97110 THERAPEUTIC EXERCISES: CPT | Mod: CQ

## 2025-06-10 NOTE — PROGRESS NOTES
Outpatient Rehab    Physical Therapy Visit    Patient Name: Anabela Carrizales  MRN: [id number]  YOB: 1995  Encounter Date: 6/10/2025    Therapy Diagnosis:   Encounter Diagnoses   Name Primary?    Difficulty in walking Yes    Decreased strength of lower extremity      Physician: Farida Guzman, *    Physician Orders: Eval and Treat  Medical Diagnosis: Patellofemoral arthralgia of both knees    Visit # / Visits Authorized:  2 / 20  Insurance Authorization Period: 5/26/2025 to 7/24/2025  Date of Evaluation: 4/9/2025  Plan of Care Certification: 5/23/2025 to 8/1/2025      PT/PTA:     Number of PTA visits since last PT visit:   Time In: 1410   Time Out: 1500  Total Time (in minutes): 50   Total Billable Time (in minutes): 30    FOTO:  Intake Score:  %  Survey Score 2:  %  Survey Score 3:  %    Precautions:       Subjective   she is doing ok. Not having too much pain.  Pain reported as 2/10.      Objective            Treatment:  Therapeutic Exercise  TE 1: HEP review: sidelying clams, bridge, SLR, SL balance  TE 3: LE bike 4 mins  TE 4: supine Hip ADD x20  TE 5: double leg press 80# 2x8  TE 6: saq 2# 2x10  TE 7: heel raises x20  TE 8: step ups 6'' x10 each        Time Entry(in minutes):       Assessment & Plan   Assessment: Pt presents to therapy with reports of similar symptoms compared to last visit. Pt still tolerates minimal exercises. Pt had discomfot on L hip with straight leg raise, sensation on dislocation. Pt was also uncomfortable with SLS on B knees       The patient will continue to benefit from skilled outpatient physical therapy in order to address the deficits listed in the problem list on the initial evaluation, provide patient and family education, and maximize the patients level of independence in the home and community environments.     The patient's spiritual, cultural, and educational needs were considered, and the patient is agreeable to the plan of care and goals.            Plan: Continue POC    Goals:   Active       LTG       Patient will improve B hip/LE strength by 1 grade or more.       Start:  05/23/25    Expected End:  08/01/25            Patient will be able to perform 1 flight of stairs in alternating pattern with increased confidence and minimal increase in adverse symptoms.       Start:  05/23/25    Expected End:  08/01/25               STG       Patient will improve B hip/LE strength by 1/2 grade.       Start:  05/23/25    Expected End:  06/27/25                Johnathan Lopez PTA

## 2025-06-23 NOTE — PROGRESS NOTES
New Patient     The patient location is: [place]  The chief complaint leading to consultation is: headache     Visit type: audiovisual    Face to Face time with patient: 45 minutes    25 minutes of total time spent on the encounter, which includes face to face time and non-face to face time preparing to see the patient (eg, review of tests), Obtaining and/or reviewing separately obtained history, Documenting clinical information in the electronic or other health record, Independently interpreting results (not separately reported) and communicating results to the patient/family/caregiver, or Care coordination (not separately reported).     Each patient to whom he or she provides medical services by telemedicine is:  (1) informed of the relationship between the physician and patient and the respective role of any other health care provider with respect to management of the patient; and (2) notified that he or she may decline to receive medical services by telemedicine and may withdraw from such care at any time.    SUBJECTIVE:  Patient ID: Anabela Carrizales   MRN: [id number]  Referred By: No ref. provider found  Chief Complaint: No chief complaint on file.      History of Present Illness:   30 y.o. female with migraine, depression, insomnia, Morgan-Danlos syndrome presents to clinic alone for evaluation of headaches.  PMHx negative for kidney stones, asthma, GI Bleed/ gastric surgery, osteoporosis, CAD/ MI, CVA/ TIA, DM, TBI, cancer    The patient endorses a longstanding history of headaches that began when she was [age] years old. The patient notes that during her childhood, she would have headaches once every few months that were associated with blurry vision and vomiting. In her teenage years, her headache frequency increased to 1-2 times per month and notes that she often had to leave school when they occurred. During this time, she tried fioricet from her pcp which did not help with her headaches. In her 20's the  "patient notes that her headaches have gradually increased in both frequency and intensity, especially after age 26. The patient has never seen a neurologist for headaches before and would like to now receive a full work up for her headaches.     The patient states that her head pain starts in the base of her skull and radiates forward and covers her entire head. This patient cannot describe how this pain feels but notes that throbbing occurs when her headache severity worsens. The patient also notes intermittent sharp/ stabbing pains in her left sided parietal region. The pain can last from 1 day to 7 days in duration. Triggers include smells, bright sunlight . The pain is relieved by  ice packs, laying down in a dark room and made worse by lights, sounds, smells. The patient states that the pain occurs at any time of day with no pattern. Pain ranges from mild to moderate to severe in intensity. She states that her pain will always progress from mild to moderate to severe. She rarely experiences a mild headache that does not progress. The patient currently experiences 30/30 headache days per month with 18 being severe. The patient endorses visual aura where she sees  lines, has blurred vision, or sees purple fire works preceding her headache. She also endorses a post-drome after her headaches where she has a "hungover" feeling.     In addition to headaches, the patient has also been an experiencing a myriad of additional neurological symptoms. The patient states that has spells where she will become dizzy/ lightheaded, she will see all white, followed by feelings of pins and needles in her face. The patient states that this has been happening since her teenage years but has noticed an increase in frequency as it now happens 4-5 times per week. These spells happen more frequently with exercise as she has noticed it when doing PT for her knee. She also notes issues with balance as she has fallen "more times than she " "can count" due to the dizzy spells and issues with balance. She often finds herself favoring one side when walking.   The patient also notes that she has been experiencing increased shortness of breath. She states that she often cannot catch her breath when she is eating or when cold air hits her face. She also notes difficulty swallowing where she feels like food is getting caught in her throat as well as GI issues that started as constipation and has now progressed to diarrhea. She also notes that she has been recently diagnosed with Morgan- Danlos syndrome by her Orthopedics doctor.     Associated symptoms (positives in bold):   photophobia, phonophobia, exercise intolerance  osmophobia   Nausea/vomiting  Visual symptoms: blurry vision, double vision, spots, olivas, distorted vision  Dizziness /Vertigo- with and without HA  Confusion- notes brain fog with and without headache and issues with word finding  Impaired concentration    Neck pain - can sometimes have radiating pain here with severe HA   Ringing in the ears- can occur without a headache but intensifies before and during a headache  Fatigue    Social History  Alcohol- denies   Smoke- Vapes   Caffeine intake- Stopped recently  Sleep - Wakes up periodically but sleeps 14-15 hours on average   Eye Exam up to date- denies   Plans for Pregnancy/ breastfeeding- in the future    Family Hx of Migraines denies    ----    Medications tried/ response:   Fioricet- did not work  Celexa- did not   Wellbutrin- did not   Topirimate- avoid, do not want to contribute to brain fog  Beta Blocker- avoid, do not want to contribute to lightheadedness    Current Medications:  Current Medications[1]    Review of Systems - as per HPI, otherwise a balanced 10 systems review is negative.    OBJECTIVE:  Vitals:  There were no vitals taken for this visit.    Physical Exam: certain limitations due to video visit platform, able to perform the following with the patient's " assistance:  Constitutional: she appears well-developed and well-nourished. she is well groomed. NAD  HENT:    Head: Normocephalic and atraumatic  Musculoskeletal: Normal range of motion.   Psychiatric: Normal mood and affect.     Neuro exam:    Mental status:  The patient is alert and oriented to person, place and time.  Language is intact and fluent  Remote and recent memory are intact  Normal attention and concentration  Mood is stable    Motor:  Normal muscle bulk and symmetry.  Tremor not apparent     Review of Data:   Notes from ortho, pcp  reviewed   Labs:  No visits with results within 3 Month(s) from this visit.   Latest known visit with results is:   Lab Visit on 03/20/2025   Component Date Value Ref Range Status    WBC 03/20/2025 7.07  3.90 - 12.70 K/uL Final    RBC 03/20/2025 4.14  4.00 - 5.40 M/uL Final    Hemoglobin 03/20/2025 12.4  12.0 - 16.0 g/dL Final    Hematocrit 03/20/2025 37.2  37.0 - 48.5 % Final    MCV 03/20/2025 90  82 - 98 fL Final    MCH 03/20/2025 30.0  27.0 - 31.0 pg Final    MCHC 03/20/2025 33.3  32.0 - 36.0 g/dL Final    RDW 03/20/2025 12.3  11.5 - 14.5 % Final    Platelets 03/20/2025 276  150 - 450 K/uL Final    MPV 03/20/2025 8.9 (L)  9.2 - 12.9 fL Final    Immature Granulocytes 03/20/2025 0.1  0.0 - 0.5 % Final    Gran # (ANC) 03/20/2025 4.2  1.8 - 7.7 K/uL Final    Immature Grans (Abs) 03/20/2025 0.01  0.00 - 0.04 K/uL Final    Lymph # 03/20/2025 2.2  1.0 - 4.8 K/uL Final    Mono # 03/20/2025 0.7  0.3 - 1.0 K/uL Final    Eos # 03/20/2025 0.0  0.0 - 0.5 K/uL Final    Baso # 03/20/2025 0.03  0.00 - 0.20 K/uL Final    nRBC 03/20/2025 0  0 /100 WBC Final    Gran % 03/20/2025 58.9  38.0 - 73.0 % Final    Lymph % 03/20/2025 30.8  18.0 - 48.0 % Final    Mono % 03/20/2025 9.8  4.0 - 15.0 % Final    Eosinophil % 03/20/2025 0.0  0.0 - 8.0 % Final    Basophil % 03/20/2025 0.4  0.0 - 1.9 % Final    Differential Method 03/20/2025 Automated   Final    Sed Rate 03/20/2025 12  0 - 20 mm/Hr Final     CRP 03/20/2025 4.0  0.0 - 8.2 mg/L Final    JOSUE Screen 03/20/2025 Positive (A)  Negative <1:80 Final    CCP Antibodies 03/20/2025 1.4  <5.0 U/mL Final    Rheumatoid Factor 03/20/2025 <13.0  0.0 - 15.0 IU/mL Final    HLA B27 Interpretation 03/20/2025    Final                    Value:TAQ: [id number]  SAPE: [id number]      B27 Testing Date 03/20/2025 03/28/2025 02:50 AM   Final    HLA B27 Result 03/20/2025 Negative   Final    Anti Sm Antibody 03/20/2025 0.09  0.00 - 0.99 Ratio Final    Anti-Sm Interpretation 03/20/2025 Negative  Negative Final    Anti-SSA Antibody 03/20/2025 0.08  0.00 - 0.99 Ratio Final    Anti-SSA Interpretation 03/20/2025 Negative  Negative Final    Anti-SSB Antibody 03/20/2025 0.08  0.00 - 0.99 Ratio Final    Anti-SSB Interpretation 03/20/2025 Negative  Negative Final    ds DNA Ab 03/20/2025 Negative 1:10  Negative 1:10 Final    Anti Sm/RNP Antibody 03/20/2025 0.09  0.00 - 0.99 Ratio Final    Anti-Sm/RNP Interpretation 03/20/2025 Negative  Negative Final    JOSUE PATTERN 1 03/20/2025 Speckled   Final    JOSUE Titer 1 03/20/2025 1:160   Final     Imaging:  No results found for this or any previous visit.  Note: I have independently reviewed any/all imaging/labs/tests and agree with the report (s) as documented.  Any discrepancies will be as noted/demarcated by free text.  ON, FNP-C 6/24/2025    ASSESSMENT:  1. Migraine with aura and without status migrainosus, not intractable    2. Balance problem    3. Brain fog    4. Numbness and tingling    5. Dizziness        ----    PLAN:  - Discussed symptoms appear to be consistent with migraine with aura, discussed treatment options and patient agreed with the following plan:  -Testing: MRI Brain W WO to rule out underlying abnormality. Patient with worsening migraines and myriad of additional neurological symptoms.  -Prevention: START Elavil/ Amitryptiline 10mg nightly for headache prevention  -As-needed treatment: START Maxalt 10mg as needed for migraine. Take at  onset of migraine and can repeat in 2 hours if needed. Start with taking 1/2 tablet to assess tolerability of medication,    -Referral to General Neurology for further work up of: balance issues, brain fog, numbness and tingling, dizziness  - Start tracking headaches via Migraine Buddy jean paul on phone or with written headache diary   - RTC in 1 month     Future Considerations:  Increase Elavil as tolerated, change to nortryptiline, injectable  Trial other triptans    Orders Placed This Encounter    MRI Brain W WO Contrast    Ambulatory referral/consult to Neurology    amitriptyline (ELAVIL) 10 MG tablet    rizatriptan (MAXALT) 10 MG tablet       I have discussed realistic goals of care with patient at length as well as medication options, and need for lifestyle adjustment. I have explained that treatment will take time. We have agreed that the goal will be to reduce frequency/intensity/quantity of HA, not to be completely HA free. I have explained my non narcotic policy regarding headache treatment.     Discussed potential for teratogenicity with treatment, patient understands if her family planning status should change she will contact office immediately and we will safely adjust medications as needed.     Questions and concerns were sought and answered to the patient's stated verbal satisfaction.  The patient verbalizes understanding and agreement with the above stated treatment plan.     Visit today included increased complexity associated with the care of the episodic problem migraine with aura, addressed and managing the longitudinal care of the patient due to the serious and/or complex managed problem(s) .     CC: Price Espino MD Olivia Noe DNP, FNP-C  [facility]  [phone]    Dr. Ching was available during today's encounter.        [1]   Current Outpatient Medications:     amitriptyline (ELAVIL) 10 MG tablet, Take 1 tablet (10 mg total) by mouth every evening., Disp: 30  tablet, Rfl: 2    ibuprofen (ADVIL,MOTRIN) 800 MG tablet, Take 1 tablet (800 mg total) by mouth every 6 (six) hours as needed for Pain., Disp: 20 tablet, Rfl: 0    levonorgestrel (KYLEENA) 17.5 mcg/24 hrs (5 yrs) 19.5 mg IUD, 1 Intra Uterine Device (17.5 mcg total) by Intrauterine route once. for 1 dose, Disp: 1 Intra Uterine Device, Rfl: 0    rizatriptan (MAXALT) 10 MG tablet, 1 tablet by mouth as needed migraine. May repeat every 2 hours for max 3 tabs in 24 hours. Use no more than 10 days per month., Disp: 10 tablet, Rfl: 2

## 2025-06-24 ENCOUNTER — OFFICE VISIT (OUTPATIENT)
Dept: NEUROLOGY | Facility: CLINIC | Age: 30
End: 2025-06-24
Payer: MEDICAID

## 2025-06-24 DIAGNOSIS — R41.89 BRAIN FOG: ICD-10-CM

## 2025-06-24 DIAGNOSIS — R26.89 BALANCE PROBLEM: ICD-10-CM

## 2025-06-24 DIAGNOSIS — R20.2 NUMBNESS AND TINGLING: ICD-10-CM

## 2025-06-24 DIAGNOSIS — G43.109 MIGRAINE WITH AURA AND WITHOUT STATUS MIGRAINOSUS, NOT INTRACTABLE: Primary | ICD-10-CM

## 2025-06-24 DIAGNOSIS — R20.0 NUMBNESS AND TINGLING: ICD-10-CM

## 2025-06-24 DIAGNOSIS — R42 DIZZINESS: ICD-10-CM

## 2025-06-24 PROCEDURE — 1159F MED LIST DOCD IN RCRD: CPT | Mod: CPTII,95,, | Performed by: NURSE PRACTITIONER

## 2025-06-24 PROCEDURE — 98002 SYNCH AUDIO-VIDEO NEW MOD 45: CPT | Mod: 95,,, | Performed by: NURSE PRACTITIONER

## 2025-06-24 PROCEDURE — 1160F RVW MEDS BY RX/DR IN RCRD: CPT | Mod: CPTII,95,, | Performed by: NURSE PRACTITIONER

## 2025-06-24 PROCEDURE — G2211 COMPLEX E/M VISIT ADD ON: HCPCS | Mod: 95,,, | Performed by: NURSE PRACTITIONER

## 2025-06-24 RX ORDER — RIZATRIPTAN BENZOATE 10 MG/1
TABLET ORAL
Qty: 10 TABLET | Refills: 2 | Status: SHIPPED | OUTPATIENT
Start: 2025-06-24

## 2025-06-24 RX ORDER — AMITRIPTYLINE HYDROCHLORIDE 10 MG/1
10 TABLET, FILM COATED ORAL NIGHTLY
Qty: 30 TABLET | Refills: 2 | Status: SHIPPED | OUTPATIENT
Start: 2025-06-24 | End: 2025-09-22

## 2025-08-26 ENCOUNTER — TELEPHONE (OUTPATIENT)
Dept: PODIATRY | Facility: CLINIC | Age: 30
End: 2025-08-26
Payer: MEDICAID

## 2025-08-29 DIAGNOSIS — M79.641 RIGHT HAND PAIN: Primary | ICD-10-CM
